# Patient Record
Sex: FEMALE | Race: WHITE | NOT HISPANIC OR LATINO | Employment: FULL TIME | URBAN - METROPOLITAN AREA
[De-identification: names, ages, dates, MRNs, and addresses within clinical notes are randomized per-mention and may not be internally consistent; named-entity substitution may affect disease eponyms.]

---

## 2021-01-20 LAB
BASOPHILS # BLD AUTO: 0.02 X10(3)/MCL (ref 0.01–0.08)
BASOPHILS NFR BLD AUTO: 0.2 % (ref 0.1–1.2)
EOSINOPHIL # BLD AUTO: 0.05 X10(3)/MCL (ref 0.04–0.36)
EOSINOPHIL NFR BLD AUTO: 0.5 % (ref 0.7–7)
ERYTHROCYTE [DISTWIDTH] IN BLOOD BY AUTOMATED COUNT: 12.2 % (ref 11–14.5)
HCT VFR BLD AUTO: 39.4 % (ref 36–48)
HCV AB SERPL QL IA: NORMAL
HGB BLD-MCNC: 13 G/DL (ref 11.8–16)
IMM GRANULOCYTES # BLD AUTO: 0.02 X10E3/UL (ref 0–0.03)
IMM GRANULOCYTES NFR BLD AUTO: 0.2 % (ref 0–0.5)
LYMPHOCYTES # BLD AUTO: 2.43 X10(3)/MCL (ref 1.16–3.74)
LYMPHOCYTES NFR BLD AUTO: 25.3 % (ref 20–55)
MCH RBC QN AUTO: 30.1 PG (ref 27–34)
MCHC RBC AUTO-ENTMCNC: 33 G/DL (ref 31–37)
MCV RBC AUTO: 91.2 FL (ref 79–99)
MONOCYTES # BLD AUTO: 0.89 X10(3)/MCL (ref 0.24–0.36)
MONOCYTES NFR BLD AUTO: 9.3 % (ref 4.7–12.5)
NEUTROPHILS # BLD AUTO: 6.19 X10(3)/MCL (ref 1.56–6.13)
NEUTROPHILS NFR BLD AUTO: 64.5 % (ref 37–73)
PLATELET # BLD AUTO: 292 X10(3)/MCL (ref 140–371)
PMV BLD AUTO: 10 FL (ref 9.4–12.4)
RBC # BLD AUTO: 4.32 X10(6)/MCL (ref 4–5.1)
WBC # SPEC AUTO: 9.6 X10(3)/MCL (ref 4–11.5)

## 2021-05-12 LAB
BASOPHILS # BLD AUTO: 0.02 X10(3)/MCL (ref 0.01–0.08)
BASOPHILS NFR BLD AUTO: 0.2 % (ref 0.1–1.2)
EOSINOPHIL # BLD AUTO: 0.06 X10(3)/MCL (ref 0.04–0.36)
EOSINOPHIL NFR BLD AUTO: 0.5 % (ref 0.7–7)
ERYTHROCYTE [DISTWIDTH] IN BLOOD BY AUTOMATED COUNT: 13.1 % (ref 11–14.5)
GLUCOSE 1H P 100 G GLC PO SERPL-MCNC: 93 MG/DL (ref 70–140)
HCT VFR BLD AUTO: 40 % (ref 36–48)
HGB BLD-MCNC: 12.5 G/DL (ref 11.8–16)
IMM GRANULOCYTES # BLD AUTO: 0.06 X10E3/UL (ref 0–0.03)
IMM GRANULOCYTES NFR BLD AUTO: 0.5 % (ref 0–0.5)
LYMPHOCYTES # BLD AUTO: 2.22 X10(3)/MCL (ref 1.16–3.74)
LYMPHOCYTES NFR BLD AUTO: 18.2 % (ref 20–55)
MCH RBC QN AUTO: 29.8 PG (ref 27–34)
MCHC RBC AUTO-ENTMCNC: 31.3 G/DL (ref 31–37)
MCV RBC AUTO: 95.5 FL (ref 79–99)
MONOCYTES # BLD AUTO: 0.78 X10(3)/MCL (ref 0.24–0.36)
MONOCYTES NFR BLD AUTO: 6.4 % (ref 4.7–12.5)
NEUTROPHILS # BLD AUTO: 9.08 X10(3)/MCL (ref 1.56–6.13)
NEUTROPHILS NFR BLD AUTO: 74.2 % (ref 37–73)
PLATELET # BLD AUTO: 301 X10(3)/MCL (ref 140–371)
PMV BLD AUTO: 10.1 FL (ref 9.4–12.4)
RBC # BLD AUTO: 4.19 X10(6)/MCL (ref 4–5.1)
RPR SER QL: NORMAL
WBC # SPEC AUTO: 12.2 X10(3)/MCL (ref 4–11.5)

## 2021-07-20 LAB
GLUCOSE UR QL STRIP: NEGATIVE
KETONES UR QL STRIP: NEGATIVE
PROTEIN, POC: NEGATIVE

## 2021-07-22 LAB
HBV SURFACE AG SERPL QL IA: NEGATIVE
HIV 1+2 AB+HIV1 P24 AG SERPL QL IA: NORMAL
RPR SER QL: NORMAL

## 2022-04-30 ENCOUNTER — HISTORICAL (OUTPATIENT)
Dept: ADMINISTRATIVE | Facility: HOSPITAL | Age: 32
End: 2022-04-30

## 2022-05-03 NOTE — HISTORICAL [facility] CERNER
This is a historical note converted from Brenda. Formatting and pictures may have been removed.  Please reference Brenda for original formatting and attached multimedia. Chief Complaint  PRESENTS TO CLINIC 11 WEEKS AND 5 DAYS FOR OB VISIT.  History of Present Illness  PRESENTS OT CLINIC 11 WEEKS AND 5 DAYS FOR OB VISIT. PNLS AND NIPS TODAY.  LMP/EGA/KAPIL  Gestational Age (EGA) and KAPIL?? ? * Note: EGA calculated as of 01/20/2021  ?  KAPIL:?08/06/2021???EGA*:?11 weeks 5 days ? ? ? ? ? ?Type:?Authoritative??????Method Date:?10/30/2020  ?  ?? ? ?Method:?Last Menstrual Period?(10/30/2020)  ?? ? ?Confirmation:?Confirmed  ?? ? ?Description:?--  ?? ? ?Comments:?--  ?? ? ?Entered by:?Giana Felipe on 12/30/2020?  ?  Other KAPIL Calculations for this Pregnancy:  ?? ? ?No additional KAPIL calculations have been recorded for this pregnancy  Gynecological History  Last Menstrual Period: 07/01/20  Date of Last Pap Smear: 07/07/20  Menstrual Status Intake: Menarcheal  Age of Menarche: 14  STIs/STDs: No  Abnormal Pap: No  Current Birth Control Method: None  Dysmenorrhea: None  Flow: Light  Duration of Flow: 4 DAYS  Frequency of Cycle: MONTHLY  Sexually Active: Yes  Review of Systems  Pertinent findings are noted in the above HPI. All other systems were reviewed and are negative.?  Physical Exam  Vitals & Measurements  HR:?92(Peripheral)? BP:?105/57?  HT:?157.48?cm? WT:?73.300?kg?  General Appearance: healthy, well-nourished and well-developed in no acute distress.  Skin: normal  Neuro/Psych: Orientation to time, place and person. Normal mood and affect and active, alert  Neck/Thyroid: Inspection/Palpation: normal. Thyroid: normal size and shape.  Respiratory: Auscultation: clear to auscultation bilaterally. Respiratory Effort: normal.  Cardiovascular: Auscultation: regular rate and rhythm  Breast:  Right: Inspection/palpation: no discharge, no masses present, no nipple retraction, no skin changes, no skin dimpling, no tenderness, no  lymphadenopathy, no axillary mass, no axillary tenderness.  Left: Inspection/palpation: no discharge, no masses present, no nipple retraction, no skin changes, no skin dimpling, no tenderness, no lymphadenopathy, no axillary mass, no axillary tenderness.  Abdomen: Auscultation/Inspection/Palpation: No tenderness or masses. Soft, nondistended  Genitourinary:  External Genitalia: normal, no lesions.  Vagina: no lesions, no cystocele, no rectocele.  Bladder: no mass, nontender.  Urethra: no erythema or lesions present.  Cervix: no discharge, no lesions, non tender.  Uterus: nontender, normal contour, normal mobility, normal size.  Adnexa: no masses, no tenderness.  Anus and Perineum: visually normal.?  Assessment/Plan  1.?Encounter for maternal care for low transverse scar from previous  delivery?O34.211  Ordered:  Clinic Follow up, *Est. 21 3:00:00 CST, Order for future visit, Encounter for maternal care for low transverse scar from previous  delivery  11 weeks gestation of pregnancy, JAY ECHOLS  OB Visit Global -No Charge, Encounter for maternal care for low transverse scar from previous  delivery  11 weeks gestation of pregnancy, JAY ECHOLS, 21 10:58:00 CST  ?  2.?11 weeks gestation of pregnancy?Z3A.11  Ordered:  Clinic Follow up, *Est. 21 3:00:00 CST, Order for future visit, Encounter for maternal care for low transverse scar from previous  delivery  11 weeks gestation of pregnancy, JAY ECHOLS  OB Visit Global -No Charge, Encounter for maternal care for low transverse scar from previous  delivery  11 weeks gestation of pregnancy, JAY RODRIGUEZN, 21 10:58:00 CST  ?  Referrals  Clinic Follow up, *Est. 21 3:00:00 CST, Order for future visit, Encounter for maternal care for low transverse scar from previous  delivery  11 weeks gestation of pregnancy, JAY ECHOLS   OB History  Pregnancy History???G4  P0(1,2,2,1)?? ??  Pregnancy # 1  Baby 1?????????????Outcome Date:?2017 ??? Outcome:?Live Birth  ???Outcome or Result:?Spontaneous   ???Gender:?--????????Gest Age:?7 weeks ??????Wt:?--  ???Hospital:?--????????Dm Labor:?--  ???Gabbi Name:?--?????Babys Father:?--  ?  Pregnancy # 2  Baby 1?????????????Outcome Date:?2018 ??? Outcome:?Live Birth  ???Outcome or Result:?Spontaneous  with D&C  ???Gender:?--????????Gest Age:?8 weeks ??????Wt:?--  ???Hospital:?--????????Dm Labor:?--  ???Gabbi Name:?--?????Babys Father:?--  ?  Pregnancy # 3  Baby 1?????????????Outcome Date:?2019????? Outcome:?Live Birth  ???Outcome or Result:?  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 ?Not taking  folic acid 1 mg oral tablet, 1 mg= 1 tab(s), Oral, Daily, 5 refills   Prental DHA One Rx, Oral, Daily  Allergies  No Known Allergies  Social History  Abuse/Neglect  No, 2020  No, 2020  Alcohol  Current, 2020  Sexual  Sexually active: Yes. No, 2020  Substance Use  Never, 2020  Never, 2020  Tobacco  Never (less than 100 in lifetime), N/A, 2020  Never (less than 100 in lifetime), N/A, 2020  Family History  Family history is negative  Health Maintenance  Health Maintenance  ???Pending?(in the next year)  ??? ??OverDue  ??? ? ? ?Influenza Vaccine due??10/01/20??and every 1??day(s)  ??? ??Due?  ??? ? ? ?Alcohol Misuse Screening due??21??and every 1??year(s)  ??? ? ? ?ADL Screening due??21??and every 1??year(s)  ??? ? ?  ?Depression Screening due??01/20/21??Unknown Frequency  ??? ? ? ?Tetanus Vaccine due??01/20/21??and every 10??year(s)  ??? ??Due In Future?  ??? ? ? ?Blood Pressure Screening not due until??12/30/21??and every 1??year(s)  ??? ? ? ?Obesity Screening not due until??01/01/22??and every 1??year(s)  ???Satisfied?(in the past 1 year)  ??? ??Satisfied?  ??? ? ? ?Blood Pressure Screening on??01/20/21.??Satisfied by Giana Felipe  ??? ? ? ?Body Mass Index Check on??12/30/20.??Satisfied by Giana Felipe  ??? ? ? ?Cervical Cancer Screening on??07/07/20.??Satisfied by Krissy Newton  ??? ? ? ?Obesity Screening on??01/20/21.??Satisfied by Giana Felipe  ?

## 2022-08-31 LAB
BENZODIAZEPINES: NORMAL
C TRACH DNA SPEC QL NAA+PROBE: NEGATIVE
CLARITHRO TITR SBT: NORMAL {TITER}
COCAINE (METAB.): NORMAL
MARIJUANA (THC) METABOLITE: NORMAL
METHADONE: NORMAL
N GONORRHOEA DNA SPEC QL NAA+PROBE: NEGATIVE
OPIATES UR QL SCN: NORMAL
PAP RECOMMENDATION EXT: NORMAL
PAP SMEAR: NORMAL
URINE CULTURE, ROUTINE: NORMAL

## 2022-09-14 LAB
ABO + RH BLD: NORMAL
GLUCOSE SERPL-MCNC: 120 MG/DL
HBV SURFACE AG SERPL QL IA: NONREACTIVE
HCT VFR BLD AUTO: 37.6 % (ref 36–46)
HCV AB SERPL QL IA: NEGATIVE
HGB BLD-MCNC: 13.1 G/DL (ref 12–16)
HIV 1+2 AB+HIV1 P24 AG SERPL QL IA: NEGATIVE
INDIRECT COOMBS: NEGATIVE
MCV RBC AUTO: 88.9 FL (ref 82–108)
PLATELET # BLD AUTO: 258 K/UL (ref 150–399)
RUBELLA IMMUNE STATUS: NORMAL
T PALLIDUM AB SER QL: NONREACTIVE

## 2022-09-17 ENCOUNTER — HISTORICAL (OUTPATIENT)
Dept: ADMINISTRATIVE | Facility: HOSPITAL | Age: 32
End: 2022-09-17

## 2022-09-29 LAB — EXT MATERNIT21: NORMAL

## 2022-10-20 LAB — QUAD SCREEN: NORMAL

## 2023-01-11 LAB
GLUCOSE SERPL-MCNC: 98 MG/DL
HCT VFR BLD AUTO: 37.5 % (ref 36–46)
HGB BLD-MCNC: 12.5 G/DL (ref 12–16)
MCV RBC AUTO: 91.9 FL (ref 82–108)
PLATELET # BLD AUTO: 246 K/UL (ref 150–399)

## 2023-01-23 ENCOUNTER — DOCUMENTATION ONLY (OUTPATIENT)
Dept: ADMINISTRATIVE | Facility: HOSPITAL | Age: 33
End: 2023-01-23
Payer: COMMERCIAL

## 2023-01-26 ENCOUNTER — TELEPHONE (OUTPATIENT)
Dept: OBSTETRICS AND GYNECOLOGY | Facility: CLINIC | Age: 33
End: 2023-01-26
Payer: COMMERCIAL

## 2023-01-26 NOTE — TELEPHONE ENCOUNTER
----- Message from Lacy Lejeune, MA sent at 1/26/2023  3:05 PM CST -----  PT IS CALLING FOR A CALL BACK FROM A NURSE ABOUT SOME QUESTIONS SHE HAD ABOUT SOME BLOODWORK

## 2023-01-26 NOTE — TELEPHONE ENCOUNTER
RETURNED. PT. CALL.  CONFIRMED. PT. WENT TO [facility] WITH CHILD AND HAS SOME CONCERNS REGARDING THIS PREG. AFTER SPEAKING WITH NEUROLOGIST.APPOINT. SCHEDULED TO DISCUSS CONCERNS WITH DR. MAC.

## 2023-02-02 ENCOUNTER — ROUTINE PRENATAL (OUTPATIENT)
Dept: OBSTETRICS AND GYNECOLOGY | Facility: CLINIC | Age: 33
End: 2023-02-02
Payer: COMMERCIAL

## 2023-02-02 VITALS — WEIGHT: 170.31 LBS | HEART RATE: 86 BPM | DIASTOLIC BLOOD PRESSURE: 56 MMHG | SYSTOLIC BLOOD PRESSURE: 94 MMHG

## 2023-02-02 DIAGNOSIS — Z3A.30 30 WEEKS GESTATION OF PREGNANCY: ICD-10-CM

## 2023-02-02 DIAGNOSIS — O34.211 ENCOUNTER FOR MATERNAL CARE FOR LOW TRANSVERSE SCAR FROM PREVIOUS CESAREAN DELIVERY: Primary | ICD-10-CM

## 2023-02-02 PROBLEM — Z98.891 HISTORY OF CESAREAN SECTION, LOW TRANSVERSE: Status: ACTIVE | Noted: 2023-02-02

## 2023-02-02 LAB
BASOPHILS # BLD AUTO: 0.03 X10(3)/MCL (ref 0.01–0.08)
BASOPHILS NFR BLD AUTO: 0.3 % (ref 0.1–1.2)
BILIRUB UR QL STRIP: NEGATIVE
C-REACTIVE PROTEIN(NORTH LA, ST. MARY, RP & JENNINGS): 0.7 MG/DL (ref 0–0.9)
EOSINOPHIL # BLD AUTO: 0.02 X10(3)/MCL (ref 0.04–0.36)
EOSINOPHIL NFR BLD AUTO: 0.2 % (ref 0.7–7)
ERYTHROCYTE [DISTWIDTH] IN BLOOD BY AUTOMATED COUNT: 12.4 % (ref 11–14.5)
FERRITIN SERPL-MCNC: 28.6 NG/ML (ref 6.24–264)
GLUCOSE UR QL STRIP: NEGATIVE
HCT VFR BLD AUTO: 36.3 % (ref 36–48)
HGB BLD-MCNC: 12.5 GM/DL (ref 11.8–16)
IMM GRANULOCYTES # BLD AUTO: 0.06 X10(3)/MCL (ref 0–0.03)
IMM GRANULOCYTES NFR BLD AUTO: 0.5 % (ref 0–0.5)
KETONES UR QL STRIP: NEGATIVE
LEUKOCYTE ESTERASE UR QL STRIP: NEGATIVE
LYMPHOCYTES # BLD AUTO: 2.12 X10(3)/MCL (ref 1.16–3.74)
LYMPHOCYTES NFR BLD AUTO: 19.4 % (ref 20–55)
MCH RBC QN AUTO: 30.9 PG (ref 27–34)
MCV RBC AUTO: 89.9 FL (ref 79–99)
MEAN CELL HEMOGLOBIN CONCENTRATION (OHS) G/DL: 34.4 G/DL (ref 31–37)
MONOCYTES # BLD AUTO: 0.73 X10(3)/MCL (ref 0.24–0.36)
MONOCYTES NFR BLD AUTO: 6.7 % (ref 4.7–12.5)
NEUTROPHILS # BLD AUTO: 7.98 X10(3)/MCL (ref 1.56–6.13)
NEUTROPHILS NFR BLD AUTO: 72.9 % (ref 37–73)
NRBC BLD AUTO-RTO: 0 % (ref 0–1)
PH, POC UA: 7
PLATELET # BLD AUTO: 269 X10(3)/MCL (ref 140–371)
PMV BLD AUTO: 9.6 FL (ref 9.4–12.4)
POC BLOOD, URINE: NEGATIVE
POC NITRATES, URINE: NEGATIVE
PROT UR QL STRIP: POSITIVE
RBC # BLD AUTO: 4.04 X10(6)/MCL (ref 4–5.1)
SP GR UR STRIP: 1.03 (ref 1–1.03)
UROBILINOGEN UR STRIP-ACNC: 1 (ref 0.1–1.1)
WBC # SPEC AUTO: 10.9 X10(3)/MCL (ref 4–11.5)

## 2023-02-02 PROCEDURE — 86140 C-REACTIVE PROTEIN: CPT | Performed by: OBSTETRICS & GYNECOLOGY

## 2023-02-02 PROCEDURE — 83550 IRON BINDING TEST: CPT | Performed by: OBSTETRICS & GYNECOLOGY

## 2023-02-02 PROCEDURE — 0502F PR SUBSEQUENT PRENATAL CARE: ICD-10-PCS | Mod: ,,, | Performed by: OBSTETRICS & GYNECOLOGY

## 2023-02-02 PROCEDURE — 82728 ASSAY OF FERRITIN: CPT | Performed by: OBSTETRICS & GYNECOLOGY

## 2023-02-02 PROCEDURE — 0502F SUBSEQUENT PRENATAL CARE: CPT | Mod: ,,, | Performed by: OBSTETRICS & GYNECOLOGY

## 2023-02-02 PROCEDURE — 85025 COMPLETE CBC W/AUTO DIFF WBC: CPT | Performed by: OBSTETRICS & GYNECOLOGY

## 2023-02-02 RX ORDER — FOLIC ACID 0.8 MG
0.8 TABLET ORAL
Status: ON HOLD | COMMUNITY
Start: 2022-08-31 | End: 2023-04-01 | Stop reason: HOSPADM

## 2023-02-02 NOTE — PROGRESS NOTES
HPI  Gena Benavidez is a 33 y.o.   31w0d here for Routine Prenatal Visit (30 WKS 1 DAY. Wants to be tested for anemia.)  Patient's second infant with hypotonia, was evaluated at [facility] by neurologist who recommended she be tested for anemia and have iron studies done. HROB visit went well and fetus was moving all extremities well. Placenta was free of c/s scar.   Today, she denies VB, ROM, CTXS. Reports active FM.     Gena's allergies, medications, history, and problem list were updated as appropriate.    ROS:  A comprehensive review of symptoms was completed and negative except as noted above.    Physical Exam:  BP (!) 94/56   Pulse 86   Wt 77.3 kg (170 lb 4.9 oz)   LMP 2022     Gen: No distress  Abdomen: Gravid, non-tender  Extremities: No edema  FHTS-130    ASSESSMENT/PLAN:  1. Encounter for maternal care for low transverse scar from previous  delivery    2. 30 weeks gestation of pregnancy  -     POCT Urinalysis, Dipstick, Automated, W/O Scope  -     CBC Auto Differential; Future; Expected date: 2023  -     Iron and TIBC; Future; Expected date: 2023  -     C-Reactive Protein; Future; Expected date: 2023  -     Ferritin; Future; Expected date: 2023    Repeat C/S scheduled for  at [facility].      Recent Results (from the past 24 hour(s))   POCT Urinalysis, Dipstick, Automated, W/O Scope    Collection Time: 23  9:47 AM   Result Value Ref Range    POC Blood, Urine Negative Negative    POC Bilirubin, Urine Negative Negative    POC Urobilinogen, Urine 1.0 0.1 - 1.1    POC Ketones, Urine Negative Negative    POC Protein, Urine Positive (A) Negative    POC Nitrates, Urine Negative Negative    POC Glucose, Urine Negative Negative    pH, UA 7.0     POC Specific Gravity, Urine 1.030 (A) 1.003 - 1.029    POC Leukocytes, Urine Negative Negative       Labor unit, Kick Counts, and Pre-eclampsia given    Follow Up:  Follow up in about 2 weeks (around  2/16/2023) for OB F/U.

## 2023-02-03 LAB
IRON SATN MFR SERPL: 22 % (ref 20–50)
IRON SERPL-MCNC: 92 UG/DL (ref 50–170)
TIBC SERPL-MCNC: 327 UG/DL (ref 70–310)
TIBC SERPL-MCNC: 419 UG/DL (ref 250–450)
TRANSFERRIN SERPL-MCNC: 386 MG/DL (ref 180–382)

## 2023-02-09 ENCOUNTER — PATIENT MESSAGE (OUTPATIENT)
Dept: OTHER | Facility: OTHER | Age: 33
End: 2023-02-09
Payer: COMMERCIAL

## 2023-02-16 ENCOUNTER — ROUTINE PRENATAL (OUTPATIENT)
Dept: OBSTETRICS AND GYNECOLOGY | Facility: CLINIC | Age: 33
End: 2023-02-16
Payer: COMMERCIAL

## 2023-02-16 VITALS — WEIGHT: 172.94 LBS | SYSTOLIC BLOOD PRESSURE: 100 MMHG | HEART RATE: 97 BPM | DIASTOLIC BLOOD PRESSURE: 70 MMHG

## 2023-02-16 DIAGNOSIS — O34.211 MATERNAL CARE DUE TO LOW TRANSVERSE UTERINE SCAR FROM PREVIOUS CESAREAN DELIVERY: Primary | ICD-10-CM

## 2023-02-16 DIAGNOSIS — Z3A.33 33 WEEKS GESTATION OF PREGNANCY: ICD-10-CM

## 2023-02-16 LAB
BILIRUB UR QL STRIP: NEGATIVE
GLUCOSE UR QL STRIP: NEGATIVE
KETONES UR QL STRIP: NEGATIVE
LEUKOCYTE ESTERASE UR QL STRIP: POSITIVE
PH, POC UA: 7
POC BLOOD, URINE: POSITIVE
POC NITRATES, URINE: NEGATIVE
PROT UR QL STRIP: NEGATIVE
SP GR UR STRIP: 1.01 (ref 1–1.03)
UROBILINOGEN UR STRIP-ACNC: 0.2 (ref 0.1–1.1)

## 2023-02-16 PROCEDURE — 0502F SUBSEQUENT PRENATAL CARE: CPT | Mod: ,,, | Performed by: OBSTETRICS & GYNECOLOGY

## 2023-02-16 PROCEDURE — 87088 URINE BACTERIA CULTURE: CPT | Performed by: OBSTETRICS & GYNECOLOGY

## 2023-02-16 PROCEDURE — 0502F PR SUBSEQUENT PRENATAL CARE: ICD-10-PCS | Mod: ,,, | Performed by: OBSTETRICS & GYNECOLOGY

## 2023-02-16 NOTE — PROGRESS NOTES
HPI  Gena Benavidez is a 33 y.o.   33w0d here for Routine Prenatal Visit   PRESENTS TO CLINIC 33 WEEKS 0 DAYS WITH NO COMPLAINTS  CBC AND IRON STUDIES WERE NL.   DENIES VB, ROM, CTXS  REPORTS ACTIVE FM.     Gena's allergies, medications, history, and problem list were updated as appropriate.    ROS:  A comprehensive review of symptoms was completed and negative except as noted above.    Physical Exam:  /70   Pulse 97   Wt 78.4 kg (172 lb 15.2 oz)   LMP 2022     Gen: No distress  Abdomen: Gravid, non-tender  Extremities: No edema    Fundal Height (cm): 33 cm  Fetal Heart Rate: 133  Movement: Present    Chaperone Present  ASSESSMENT/PLAN:  1. Maternal care due to low transverse uterine scar from previous  delivery    2. 33 weeks gestation of pregnancy  -     POCT Urinalysis, Dipstick, Automated, W/O Scope  -     Cancel: POCT Urinalysis, Dipstick, Automated, W/O Scope  -     Urine culture         Recent Results (from the past 24 hour(s))   POCT Urinalysis, Dipstick, Automated, W/O Scope    Collection Time: 23  9:09 AM   Result Value Ref Range    POC Blood, Urine Positive (A) Negative    POC Bilirubin, Urine Negative Negative    POC Urobilinogen, Urine 0.2 0.1 - 1.1    POC Ketones, Urine Negative Negative    POC Protein, Urine Negative Negative    POC Nitrates, Urine Negative Negative    POC Glucose, Urine Negative Negative    pH, UA 7.0     POC Specific Gravity, Urine 1.015 1.003 - 1.029    POC Leukocytes, Urine Positive (A) Negative       Labor unit, Kick Counts, and Pre-eclampsia given  Chaperone Present  Follow Up:  Follow up in about 2 weeks (around 3/2/2023) for OB F/U.

## 2023-02-19 LAB — BACTERIA UR CULT: NORMAL

## 2023-03-01 ENCOUNTER — ROUTINE PRENATAL (OUTPATIENT)
Dept: OBSTETRICS AND GYNECOLOGY | Facility: CLINIC | Age: 33
End: 2023-03-01
Payer: COMMERCIAL

## 2023-03-01 VITALS — SYSTOLIC BLOOD PRESSURE: 94 MMHG | HEART RATE: 80 BPM | DIASTOLIC BLOOD PRESSURE: 56 MMHG | WEIGHT: 176 LBS

## 2023-03-01 DIAGNOSIS — Z3A.34 34 WEEKS GESTATION OF PREGNANCY: Primary | ICD-10-CM

## 2023-03-01 LAB
BILIRUB UR QL STRIP: NEGATIVE
GLUCOSE UR QL STRIP: NEGATIVE
KETONES UR QL STRIP: NEGATIVE
LEUKOCYTE ESTERASE UR QL STRIP: NEGATIVE
PH, POC UA: 7
POC BLOOD, URINE: NEGATIVE
POC NITRATES, URINE: NEGATIVE
PROT UR QL STRIP: NEGATIVE
SP GR UR STRIP: 1.02 (ref 1–1.03)
UROBILINOGEN UR STRIP-ACNC: 2 (ref 0.1–1.1)

## 2023-03-01 PROCEDURE — 0502F SUBSEQUENT PRENATAL CARE: CPT | Mod: ,,, | Performed by: NURSE PRACTITIONER

## 2023-03-01 PROCEDURE — 0502F PR SUBSEQUENT PRENATAL CARE: ICD-10-PCS | Mod: ,,, | Performed by: NURSE PRACTITIONER

## 2023-03-01 RX ORDER — CALCIUM CARBONATE 200(500)MG
1 TABLET,CHEWABLE ORAL DAILY
Status: ON HOLD | COMMUNITY
End: 2023-04-01 | Stop reason: HOSPADM

## 2023-03-01 NOTE — PROGRESS NOTES
HPI  Gena Benavidez is a 33 y.o.   34w6d here for Routine Prenatal Visit (34 weeks and 6 days. C/O reflux taking OTC Tums with relief/)      Annes allergies, medications, history, and problem list were updated as appropriate.    ROS:  A comprehensive review of symptoms was completed and negative except as noted above.    Physical Exam:  BP (!) 94/56   Pulse 80   Wt 79.8 kg (176 lb)   LMP 2022     Gen: No distress  Abdomen: Gravid, non-tender  Extremities: No edema    Fundal Height (cm): 34 cm  Fetal Heart Rate: 146  Movement: Present    Chaperone Present  ASSESSMENT/PLAN:  1. 34 weeks gestation of pregnancy  -     POCT Urinalysis, Dipstick, Automated, W/O Scope         Recent Results (from the past 24 hour(s))   POCT Urinalysis, Dipstick, Automated, W/O Scope    Collection Time: 23  2:27 PM   Result Value Ref Range    POC Blood, Urine Negative Negative    POC Bilirubin, Urine Negative Negative    POC Urobilinogen, Urine 2.0 (A) 0.1 - 1.1    POC Ketones, Urine Negative Negative    POC Protein, Urine Negative Negative    POC Nitrates, Urine Negative Negative    POC Glucose, Urine Negative Negative    pH, UA 7.0     POC Specific Gravity, Urine 1.020 1.003 - 1.029    POC Leukocytes, Urine Negative Negative       Labor unit, Kick Counts, and Pre-eclampsia given  Chaperone Present  Follow Up:  Follow up in about 2 weeks (around 3/15/2023) for OB Visit.

## 2023-03-02 ENCOUNTER — PATIENT MESSAGE (OUTPATIENT)
Dept: OTHER | Facility: OTHER | Age: 33
End: 2023-03-02
Payer: COMMERCIAL

## 2023-03-02 ENCOUNTER — TELEPHONE (OUTPATIENT)
Dept: OBSTETRICS AND GYNECOLOGY | Facility: CLINIC | Age: 33
End: 2023-03-02
Payer: COMMERCIAL

## 2023-03-02 NOTE — TELEPHONE ENCOUNTER
"----- Message from Jojo Linton sent at 3/2/2023  3:45 PM CST -----  Regarding: CALL BACK  PT CALLED ASKING IF A NURSE CAN CALL HER BACK. SHE SAID THAT SHE'S BEEN HAVING SOME HEARTBURN AND NAUSEA AND SOME "WEIRD STOOL" AND SHE SAID SHE SEEN THAT SOMETHING ON HER UA FROM YESTERDAY WAS ELEVATED. [phone]    "

## 2023-03-02 NOTE — TELEPHONE ENCOUNTER
RETURNED PT. CALL.  CONFIRMED.STATES HEARTBURN HAS BEEN WORSE AND HAS BEEN HAVING PALE YELLOW STOOLS. HAS BEEN TAKING TUMS FOR HEARTBURN. DR. MAC NOTIFIED OF C/O'S. INSTRUCTED PT. CAN TAKE OTC PRILOSEC OR PREVACID AS DIRECTED AND MONITOR SYMPTOMS IF WORSEN OR HAS EPIGASTRIC PAIN GO TO LABOR UNIT FOR EVALUATION. APPOINT SCHEDULED FOR NEXT WEEK.

## 2023-03-08 ENCOUNTER — ROUTINE PRENATAL (OUTPATIENT)
Dept: OBSTETRICS AND GYNECOLOGY | Facility: CLINIC | Age: 33
End: 2023-03-08
Payer: COMMERCIAL

## 2023-03-08 VITALS — WEIGHT: 171.75 LBS | HEART RATE: 80 BPM | DIASTOLIC BLOOD PRESSURE: 60 MMHG | SYSTOLIC BLOOD PRESSURE: 108 MMHG

## 2023-03-08 DIAGNOSIS — Z3A.36 36 WEEKS GESTATION OF PREGNANCY: Primary | ICD-10-CM

## 2023-03-08 LAB
BILIRUB UR QL STRIP: NEGATIVE
GLUCOSE UR QL STRIP: NEGATIVE
KETONES UR QL STRIP: NEGATIVE
LEUKOCYTE ESTERASE UR QL STRIP: NEGATIVE
PH, POC UA: 5.5
POC BLOOD, URINE: NEGATIVE
POC NITRATES, URINE: NEGATIVE
PROT UR QL STRIP: NEGATIVE
SP GR UR STRIP: 1.01 (ref 1–1.03)
UROBILINOGEN UR STRIP-ACNC: 0.2 (ref 0.1–1.1)

## 2023-03-08 PROCEDURE — 0502F PR SUBSEQUENT PRENATAL CARE: ICD-10-PCS | Mod: ,,, | Performed by: NURSE PRACTITIONER

## 2023-03-08 PROCEDURE — 0502F SUBSEQUENT PRENATAL CARE: CPT | Mod: ,,, | Performed by: NURSE PRACTITIONER

## 2023-03-08 NOTE — PROGRESS NOTES
HPI  Gena Benavidez is a 33 y.o.   35w6d here for Routine Prenatal Visit (PT PRESENTS TO CLINIC FOR OB VISIT, OTHERWISE NO COMPLAINTS)      Gena's allergies, medications, history, and problem list were updated as appropriate.  T  ROS:  A comprehensive review of symptoms was completed and negative except as noted above.    Physical Exam:  /60   Pulse 80   Wt 77.9 kg (171 lb 11.8 oz)   LMP 2022     Gen: No distress  Abdomen: Gravid, non-tender  Extremities: No edema    [unfilled]    Chaperone Present  ASSESSMENT/PLAN:  1. 36 weeks gestation of pregnancy  -     POCT Urinalysis, Dipstick, Automated, W/O Scope         Recent Results (from the past 24 hour(s))   POCT Urinalysis, Dipstick, Automated, W/O Scope    Collection Time: 23 11:10 AM   Result Value Ref Range    POC Blood, Urine Negative Negative    POC Bilirubin, Urine Negative Negative    POC Urobilinogen, Urine 0.2 0.1 - 1.1    POC Ketones, Urine Negative Negative    POC Protein, Urine Negative Negative    POC Nitrates, Urine Negative Negative    POC Glucose, Urine Negative Negative    pH, UA 5.5     POC Specific Gravity, Urine 1.010 1.003 - 1.029    POC Leukocytes, Urine Negative Negative       Labor unit, Kick Counts, and Pre-eclampsia given  Chaperone Present  Follow Up:  Follow up in about 1 week (around 3/15/2023) for OB FOLLOW UP.

## 2023-03-15 ENCOUNTER — ROUTINE PRENATAL (OUTPATIENT)
Dept: OBSTETRICS AND GYNECOLOGY | Facility: CLINIC | Age: 33
End: 2023-03-15
Payer: COMMERCIAL

## 2023-03-15 VITALS — HEART RATE: 74 BPM | WEIGHT: 171 LBS | SYSTOLIC BLOOD PRESSURE: 104 MMHG | DIASTOLIC BLOOD PRESSURE: 66 MMHG

## 2023-03-15 DIAGNOSIS — Z3A.36 36 WEEKS GESTATION OF PREGNANCY: ICD-10-CM

## 2023-03-15 DIAGNOSIS — O34.211 MATERNAL CARE DUE TO LOW TRANSVERSE UTERINE SCAR FROM PREVIOUS CESAREAN DELIVERY: Primary | ICD-10-CM

## 2023-03-15 LAB
BILIRUB UR QL STRIP: NEGATIVE
GLUCOSE UR QL STRIP: NEGATIVE
KETONES UR QL STRIP: POSITIVE
LEUKOCYTE ESTERASE UR QL STRIP: POSITIVE
PH, POC UA: 7
POC BLOOD, URINE: NEGATIVE
POC NITRATES, URINE: NEGATIVE
PROT UR QL STRIP: NEGATIVE
SP GR UR STRIP: 1.02 (ref 1–1.03)
UROBILINOGEN UR STRIP-ACNC: 1 (ref 0.1–1.1)

## 2023-03-15 PROCEDURE — 0502F SUBSEQUENT PRENATAL CARE: CPT | Mod: ,,, | Performed by: OBSTETRICS & GYNECOLOGY

## 2023-03-15 PROCEDURE — 0502F PR SUBSEQUENT PRENATAL CARE: ICD-10-PCS | Mod: ,,, | Performed by: OBSTETRICS & GYNECOLOGY

## 2023-03-15 PROCEDURE — 87653 STREP B DNA AMP PROBE: CPT | Performed by: OBSTETRICS & GYNECOLOGY

## 2023-03-15 NOTE — PROGRESS NOTES
HPI  Gena Benavidez is a 33 y.o.   37w0d here for Routine Prenatal Visit   PRE-OP FOR DELIVERY, PROCEDURE DISCUSSED IN DETAIL AS WELL AS RISK, BENEFITS, AND ALTERNATIVES. QUESTIONS ANSWERED AND CONSENTS SIGNED. GBS TODAY.  DENIES VB, ROM, CTXS, REPORTS ACTIVE FM.      Gena's allergies, medications, history, and problem list were updated as appropriate.    ROS:  A comprehensive review of symptoms was completed and negative except as noted above.    Physical Exam:  /66   Pulse 74   Wt 77.6 kg (171 lb)   LMP 2022     Gen: No distress  Abdomen: Gravid, non-tender  Extremities: No edema    Fetal Heart Rate: 138  Movement: Present  Dilation: Closed  Effacement (%): 0   GBS VAGINAL-RECTAL CULTURE COLLECTED.    Recent Results (from the past 24 hour(s))   POCT Urinalysis, Dipstick, Automated, W/O Scope    Collection Time: 03/15/23  2:51 PM   Result Value Ref Range    POC Blood, Urine Negative Negative    POC Bilirubin, Urine Negative Negative    POC Urobilinogen, Urine 1.0 0.1 - 1.1    POC Ketones, Urine Positive (A) Negative    POC Protein, Urine Negative Negative    POC Nitrates, Urine Negative Negative    POC Glucose, Urine Negative Negative    pH, UA 7.0     POC Specific Gravity, Urine 1.025 1.003 - 1.029    POC Leukocytes, Urine Positive (A) Negative     Chaperone Present  ASSESSMENT/PLAN:  1. Maternal care due to low transverse uterine scar from previous  delivery    2. 36 weeks gestation of pregnancy  -     Cancel: Strep Group B by PCR; Future; Expected date: 03/15/2023  -     POCT Urinalysis, Dipstick, Automated, W/O Scope  -     Strep Group B by PCR      Labor unit, Kick Counts, and Pre-eclampsia given  Chaperone Present  Follow Up:  Follow up in about 1 week (around 3/22/2023) for OB F/U.

## 2023-03-16 LAB — STREP B PCR (OHS): NOT DETECTED

## 2023-03-21 ENCOUNTER — HOSPITAL ENCOUNTER (OUTPATIENT)
Dept: PREADMISSION TESTING | Facility: HOSPITAL | Age: 33
Discharge: HOME | End: 2023-03-21
Payer: COMMERCIAL

## 2023-03-21 DIAGNOSIS — Z3A.37 37 WEEKS GESTATION OF PREGNANCY: Primary | ICD-10-CM

## 2023-03-21 LAB
BASOPHILS # BLD AUTO: 0.03 X10(3)/MCL (ref 0.01–0.08)
BASOPHILS NFR BLD AUTO: 0.3 % (ref 0.1–1.2)
EOSINOPHIL # BLD AUTO: 0.02 X10(3)/MCL (ref 0.04–0.36)
EOSINOPHIL NFR BLD AUTO: 0.2 % (ref 0.7–7)
ERYTHROCYTE [DISTWIDTH] IN BLOOD BY AUTOMATED COUNT: 12.9 % (ref 11–14.5)
HBV SURFACE AG SERPL QL IA: NEGATIVE
HBV SURFACE AG SERPL QL IA: NORMAL
HCT VFR BLD AUTO: 37.8 % (ref 36–48)
HGB BLD-MCNC: 12.7 G/DL (ref 11.8–16)
IMM GRANULOCYTES # BLD AUTO: 0.04 X10(3)/MCL (ref 0–0.03)
IMM GRANULOCYTES NFR BLD AUTO: 0.4 % (ref 0–0.5)
LYMPHOCYTES # BLD AUTO: 2.37 X10(3)/MCL (ref 1.16–3.74)
LYMPHOCYTES NFR BLD AUTO: 23.6 % (ref 20–55)
MCH RBC QN AUTO: 30.5 PG (ref 27–34)
MCV RBC AUTO: 90.6 FL (ref 79–99)
MEAN CELL HEMOGLOBIN CONCENTRATION (OHS) G/DL: 33.6 G/DL (ref 31–37)
MONOCYTES # BLD AUTO: 0.66 X10(3)/MCL (ref 0.24–0.36)
MONOCYTES NFR BLD AUTO: 6.6 % (ref 4.7–12.5)
NEUTROPHILS # BLD AUTO: 6.92 X10(3)/MCL (ref 1.56–6.13)
NEUTROPHILS NFR BLD AUTO: 68.9 % (ref 37–73)
NRBC BLD AUTO-RTO: 0 % (ref 0–1)
PLATELET # BLD AUTO: 254 X10(3)/MCL (ref 140–371)
PMV BLD AUTO: 9.8 FL (ref 9.4–12.4)
RBC # BLD AUTO: 4.17 X10(6)/MCL (ref 4–5.1)
WBC # SPEC AUTO: 10 X10(3)/MCL (ref 4–11.5)

## 2023-03-21 PROCEDURE — 85025 COMPLETE CBC W/AUTO DIFF WBC: CPT | Performed by: OBSTETRICS & GYNECOLOGY

## 2023-03-21 PROCEDURE — 87389 HIV-1 AG W/HIV-1&-2 AB AG IA: CPT | Performed by: OBSTETRICS & GYNECOLOGY

## 2023-03-21 PROCEDURE — 86780 TREPONEMA PALLIDUM: CPT | Performed by: OBSTETRICS & GYNECOLOGY

## 2023-03-21 PROCEDURE — 87340 HEPATITIS B SURFACE AG IA: CPT | Performed by: OBSTETRICS & GYNECOLOGY

## 2023-03-21 NOTE — DISCHARGE INSTRUCTIONS
OB Pre-operative Instructions    You are scheduled for an operative procedure on March 30 th at 7 am. Please follow the instructions listed below.     Please report to the OB department on March 30th at 5:00 for your surgery. Enter through the emergency room entrance.     Should you develop a cold, temperature, sore throat, cough, or any other indication of illness prior to your scheduled procedure, please notify your physician.    DO NOT EAT OR DRINK ANYTHING AFTER 12:00 MIDNIGHT the night before your surgery or the morning of your procedure until after your procedure is completed and instructed by your nurse. This includes coffee, water, gum, and mints. You may brush your teeth, but do not swallow any water. Do not use any tobacco products the morning of the procedure (including dips, chews, smoking).    Please bathe and shampoo your hair before coming to the hospital.    Dress casually and wear loose, comfortable clothing. Please remove all make-up and nail polish. You will be given a patient gown for surgery. You must remove all undergarments, jewelry, and dentures unless instructed otherwise. Please leave all valuables at home or in the care of your family.    Any questions or further information should be directed to the out-patient nurse at [phone] between the hours of 8:00 a.m. and 3:00 p.m., Monday through Friday.    PATIENT PRE-OP INSTRUCTIONS:    Dear patient:    To help prevent an infection after your surgery, you will be given 2 towelettes to use the night before your surgery after you shower. Following these instructions carefully may help prevent an infection after your procedure.     Shower: You must shower the evening before your surgery!  A shower is best because it helps to wash away the dirt and germs.  A bath is acceptable if a shower is not available.  Be sure to rinse thoroughly with clean water.     1.  Do not shave the area of your body where surgery will be done.   2.  Wash with  regular soap and water and rinse off. (If bathing change the water.)  3.  Shampoo with your regular shampoo and rinse your hair.   4.  Use a clean towel to dry off your body.   5.  Do not use lotion, cream, or powder afterward.   6.  See instructions for towelettes and follow.   7.  Put on clean clothing and be sure to have clean sheets on your bed.   8.  Wear clean clothes the day of your procedure.     Eleuterio 2% Chlorhexidine Gluconate* Clothes:  On the evening before your surgery after your shower follow these instructions:  Once dry after showering vigorously  wipe the area where your surgery will be performed with a Eleuterio cloth for approximately 3 minutes. Dispose of the first cloth and allow area to air dry for 1 minute. Do not rinse or dry off area.  Use the second cloth to wipe off the rest of your body excluding your face and perineal/buttock areas.  Dispose of cloth and allow area to air dry. Do not rinse.     Condition of Skin:  Please tell your nurse if you are having any issues with your skin.  Any rash, scratch, or break in the skin including insect bites should be reported. Let your nurse know as soon as you arrive.     Prevent infection:  Maintaining good hygiene is very important in infection prevention.  Handwashing is especially helpful.  Cleaning your hands with an alcohol based hand  is also effective.  Please be sure to follow your doctor's instructions in the pre and post operative period!       OB VISITATION POLICY    The OB Department is open to family for patient visitation.  Smoking is not allowed on the premises.  Visitors must check with staff before entering a patient's room if there is a sign posted on the door or in the armstrong.   Visitors may be in the room with baby if they are free from infection or any signs and symptoms of illness.  All visitors must use hand  or soap and water before touching the baby.   No children under age 12 are allowed to sleep overnight or to  be left unattended in room.   Our rooms only accommodate one overnight guest.  While in labor and delivery room, you will be allowed 2 support persons if you choose and one additional guest will be allowed at the discretion of the physician or nurse.   The 2-3 labor and delivery support people may be interchangeable at the discretion of the OB nurse in charge of your care.   Any other visitors will be directed to the waiting room or post-partum room until after delivery.   No one will be allowed to stay in the labor and delivery hallway.  After delivery, visitors will not be limited unless a procedure is in progress or your conditions warrants it.   Every day between 1:00 and 3:00 pm visitors are discouraged from entering the OB unit to encourage a mother/baby rest and bonding time.   Videos and photographs are not allowed to be taken during the delivery process. They may be taken after the baby is born and declared in good condition by nursing staff or physician.   If you will be having a delivery by , you will be allowed (1) support person in operating room unless the procedure is an emergency or under general anesthesia. No video or photography is allowed until after the procedure is complete.  The support person present will be asked to leave the operating room with the baby after delivery or if any problems arise.   Family and friends should be made familiar with the visitor policy for our facility, so that we may insure that the safety and well-being of you and your baby during this very important time.        Things to bring to the Hospital    To help make your stay as comfortable as possible you should bring the following items when you come to the hospital:    TOOTHBRUSH  TOOTHPASTE  SHAMPOO/CONDITIONER  DEODORANT  HAIRBRUSH OR COMB  UNDERWEAR  SOAP  SANITARY NAPKINS-OVERNIGHTS (This item is optional unless you have a preference for the type you use.)  NURSING PADS  BREAST PUMP IF YOU OWN  ONE    Other items needed for both mom and baby include:    Bras (2-3).  These should be ones that were worn during pregnancy or nursing bras for breastfeeding.   Gowns, slippers, and a robe.   One outfit of baby clothes to take the baby home in as well as a blanket. The hospital will provide baby clothes, blankets, diapers, and wipes during the hospital stay.   Federally approved car seat is required for the infant to go home.

## 2023-03-21 NOTE — PRE-PROCEDURE INSTRUCTIONS
OB pre-admit instructions given for upcoming delivery. Patient states understanding of information. Encouraged any questions and concerns. Lab work completed. Patient preference includes breast feeding and would like Dr. Shepard to take care of infant during hospital stay.

## 2023-03-22 ENCOUNTER — ROUTINE PRENATAL (OUTPATIENT)
Dept: OBSTETRICS AND GYNECOLOGY | Facility: CLINIC | Age: 33
End: 2023-03-22
Payer: COMMERCIAL

## 2023-03-22 VITALS — DIASTOLIC BLOOD PRESSURE: 64 MMHG | SYSTOLIC BLOOD PRESSURE: 124 MMHG | WEIGHT: 171.19 LBS | HEART RATE: 90 BPM

## 2023-03-22 DIAGNOSIS — Z3A.37 37 WEEKS GESTATION OF PREGNANCY: ICD-10-CM

## 2023-03-22 DIAGNOSIS — O34.211 MATERNAL CARE DUE TO LOW TRANSVERSE UTERINE SCAR FROM PREVIOUS CESAREAN DELIVERY: Primary | ICD-10-CM

## 2023-03-22 LAB
BILIRUB UR QL STRIP: NEGATIVE
GLUCOSE UR QL STRIP: NEGATIVE
HIV 1+2 AB+HIV1 P24 AG SERPL QL IA: NONREACTIVE
KETONES UR QL STRIP: NEGATIVE
LEUKOCYTE ESTERASE UR QL STRIP: NEGATIVE
PH, POC UA: 7
POC BLOOD, URINE: POSITIVE
POC NITRATES, URINE: NEGATIVE
PROT UR QL STRIP: NEGATIVE
SP GR UR STRIP: 1.01 (ref 1–1.03)
T PALLIDUM AB SER QL: NONREACTIVE
UROBILINOGEN UR STRIP-ACNC: 0.2 (ref 0.1–1.1)

## 2023-03-22 PROCEDURE — 0502F SUBSEQUENT PRENATAL CARE: CPT | Mod: ,,, | Performed by: NURSE PRACTITIONER

## 2023-03-22 PROCEDURE — 87088 URINE BACTERIA CULTURE: CPT | Performed by: NURSE PRACTITIONER

## 2023-03-22 PROCEDURE — 0502F PR SUBSEQUENT PRENATAL CARE: ICD-10-PCS | Mod: ,,, | Performed by: NURSE PRACTITIONER

## 2023-03-22 NOTE — PROGRESS NOTES
HPI  Gena Benavidez is a 33 y.o.   37w6d here for Routine Prenatal Visit (PT PRESENTS TO CLINIC FOR OB VISIT, OTHERWISE NO COMPLAINTS.)      Gena's allergies, medications, history, and problem list were updated as appropriate.    ROS:  A comprehensive review of symptoms was completed and negative except as noted above.    Physical Exam:  /64   Pulse 90   Wt 77.7 kg (171 lb 3 oz)   LMP 2022     Gen: No distress  Abdomen: Gravid, non-tender  Extremities: No edema    Fetal Heart Rate: 137  Movement: Present     Recent Results (from the past 24 hour(s))   Hepatitis B Surface Antigen    Collection Time: 23  1:33 PM   Result Value Ref Range    Hepatitis B Surface Antigen Negative Negative    Hepatitis B Surface Antigen#     HIV 1/2 Ag/Ab (4th Gen)    Collection Time: 23  1:33 PM   Result Value Ref Range    HIV Nonreactive Nonreactive   CBC with Differential    Collection Time: 23  1:33 PM   Result Value Ref Range    WBC 10.0 4.0 - 11.5 x10(3)/mcL    RBC 4.17 4.00 - 5.10 x10(6)/mcL    Hgb 12.7 11.8 - 16.0 g/dL    Hct 37.8 36.0 - 48.0 %    MCV 90.6 79.0 - 99.0 fL    MCH 30.5 27.0 - 34.0 pg    MCHC 33.6 31.0 - 37.0 g/dL    RDW 12.9 11.0 - 14.5 %    Platelet 254 140 - 371 x10(3)/mcL    MPV 9.8 9.4 - 12.4 fL    Neut % 68.9 37 - 73 %    Lymph % 23.6 20 - 55 %    Mono % 6.6 4.7 - 12.5 %    Eos % 0.2 (L) 0.7 - 7 %    Basophil % 0.3 0.1 - 1.2 %    Lymph # 2.37 1.16 - 3.74 x10(3)/mcL    Neut # 6.92 (H) 1.56 - 6.13 x10(3)/mcL    Mono # 0.66 (H) 0.24 - 0.36 x10(3)/mcL    Eos # 0.02 (L) 0.04 - 0.36 x10(3)/mcL    Baso # 0.03 0.01 - 0.08 x10(3)/mcL    IG# 0.04 (H) 0.0001 - 0.031 x10(3)/mcL    IG% 0.4 0 - 0.5 %    NRBC% 0.0 0 - 1 %   SYPHILIS ANTIBODY (WITH REFLEX RPR)    Collection Time: 23  1:33 PM   Result Value Ref Range    Syphilis Antibody Nonreactive Nonreactive, Equivocal   POCT Urinalysis, Dipstick, Automated, W/O Scope    Collection Time: 23 11:07 AM   Result Value Ref  Range    POC Blood, Urine Positive (A) Negative    POC Bilirubin, Urine Negative Negative    POC Urobilinogen, Urine 0.2 0.1 - 1.1    POC Ketones, Urine Negative Negative    POC Protein, Urine Negative Negative    POC Nitrates, Urine Negative Negative    POC Glucose, Urine Negative Negative    pH, UA 7.0     POC Specific Gravity, Urine 1.010 1.003 - 1.029    POC Leukocytes, Urine Negative Negative     Chaperone Present  ASSESSMENT/PLAN:  1. Maternal care due to low transverse uterine scar from previous  delivery  -     POCT Urinalysis, Dipstick, Automated, W/O Scope    2. 37 weeks gestation of pregnancy  -     Urine Culture High Risk      Labor unit, Kick Counts, and Pre-eclampsia given  Chaperone Present  Pt is scheduled for c/s  Follow Up:  1 wk ob fu

## 2023-03-25 LAB — BACTERIA UR CULT: NORMAL

## 2023-03-28 ENCOUNTER — ROUTINE PRENATAL (OUTPATIENT)
Dept: OBSTETRICS AND GYNECOLOGY | Facility: CLINIC | Age: 33
End: 2023-03-28
Payer: COMMERCIAL

## 2023-03-28 VITALS — DIASTOLIC BLOOD PRESSURE: 62 MMHG | WEIGHT: 171.5 LBS | HEART RATE: 76 BPM | SYSTOLIC BLOOD PRESSURE: 106 MMHG

## 2023-03-28 DIAGNOSIS — Z3A.38 38 WEEKS GESTATION OF PREGNANCY: ICD-10-CM

## 2023-03-28 DIAGNOSIS — O34.211 MATERNAL CARE DUE TO LOW TRANSVERSE UTERINE SCAR FROM PREVIOUS CESAREAN DELIVERY: Primary | ICD-10-CM

## 2023-03-28 PROCEDURE — 0502F SUBSEQUENT PRENATAL CARE: CPT | Mod: ,,, | Performed by: NURSE PRACTITIONER

## 2023-03-28 PROCEDURE — 0502F PR SUBSEQUENT PRENATAL CARE: ICD-10-PCS | Mod: ,,, | Performed by: NURSE PRACTITIONER

## 2023-03-28 NOTE — H&P (VIEW-ONLY)
HPI  Gena Benavidez is a 33 y.o.   38w5d here for Routine Prenatal Visit (NO C/Os)      Gena's allergies, medications, history, and problem list were updated as appropriate.    ROS:  A comprehensive review of symptoms was completed and negative except as noted above.    Physical Exam:  /62   Pulse 76   Wt 77.8 kg (171 lb 8.3 oz)   LMP 2022     Gen: No distress  Abdomen: Gravid, non-tender  Extremities: No edema    Movement: Present  Recent Results (from the past 24 hour(s))   POCT Urinalysis, Dipstick, Automated, W/O Scope    Collection Time: 23 12:08 PM   Result Value Ref Range    POC Blood, Urine Positive (A) Negative    POC Bilirubin, Urine Negative Negative    POC Urobilinogen, Urine 0.2 0.1 - 1.1    POC Ketones, Urine Negative Negative    POC Protein, Urine Negative Negative    POC Nitrates, Urine Negative Negative    POC Glucose, Urine Negative Negative    pH, UA 7.0     POC Specific Gravity, Urine 1.015 1.003 - 1.029    POC Leukocytes, Urine Positive (A) Negative     Chaperone Present  ASSESSMENT/PLAN:  1. Maternal care due to low transverse uterine scar from previous  delivery    2. 38 weeks gestation of pregnancy  -     POCT Urinalysis, Dipstick, Automated, W/O Scope  -     Urine Culture High Risk      Labor unit, Kick Counts, and Pre-eclampsia given  Chaperone Present  Follow Up:  No follow-ups on file.   PT DECLINED CERVICAL EXAM

## 2023-03-29 ENCOUNTER — ANESTHESIA EVENT (OUTPATIENT)
Dept: SURGERY | Facility: HOSPITAL | Age: 33
End: 2023-03-29
Payer: COMMERCIAL

## 2023-03-29 NOTE — ANESTHESIA PREPROCEDURE EVALUATION
03/29/2023  Gena Benavidez is a 33 y.o., female.      Pre-op Assessment    I have reviewed the Patient Summary Reports.     I have reviewed the Nursing Notes. I have reviewed the NPO Status.   I have reviewed the Medications.     Review of Systems  Anesthesia Hx:  No problems with previous Anesthesia  Denies Family Hx of Anesthesia complications.   Denies Personal Hx of Anesthesia complications.   Social:  Non-Smoker    Hematology/Oncology:  Hematology Normal   Oncology Normal     EENT/Dental:EENT/Dental Normal   Cardiovascular:  Cardiovascular Normal Exercise tolerance: good     Pulmonary:  Pulmonary Normal    Renal/:  Renal/ Normal     Hepatic/GI:  Hepatic/GI Normal    Musculoskeletal:  Musculoskeletal Normal    Neurological:  Neurology Normal    Endocrine:  Endocrine Normal    Dermatological:  Skin Normal    Psych:  Psychiatric Normal           Physical Exam  General: Well nourished, Cooperative, Alert and Oriented    Airway:  Mallampati: II / II  Mouth Opening: Normal  TM Distance: Normal  Tongue: Normal  Neck ROM: Normal ROM    Dental:  Intact        Anesthesia Plan  Type of Anesthesia, risks & benefits discussed:    Anesthesia Type: Spinal  Intra-op Monitoring Plan: Standard ASA Monitors  Post Op Pain Control Plan: epidural analgesia  Informed Consent: Informed consent signed with the Patient and all parties understand the risks and agree with anesthesia plan.  All questions answered. Patient consented to blood products? Yes  ASA Score: 2  Day of Surgery Review of History & Physical: H&P Update referred to the surgeon/provider.I have interviewed and examined the patient. I have reviewed the patient's H&P dated: There are no significant changes.     Ready For Surgery From Anesthesia Perspective.     .

## 2023-03-30 ENCOUNTER — HOSPITAL ENCOUNTER (INPATIENT)
Facility: HOSPITAL | Age: 33
LOS: 2 days | Discharge: HOME | End: 2023-04-01
Attending: OBSTETRICS & GYNECOLOGY | Admitting: OBSTETRICS & GYNECOLOGY
Payer: COMMERCIAL

## 2023-03-30 ENCOUNTER — ANESTHESIA (OUTPATIENT)
Dept: SURGERY | Facility: HOSPITAL | Age: 33
End: 2023-03-30
Payer: COMMERCIAL

## 2023-03-30 DIAGNOSIS — Z98.891 S/P CESAREAN SECTION: ICD-10-CM

## 2023-03-30 DIAGNOSIS — Z98.891 HISTORY OF CESAREAN SECTION, LOW TRANSVERSE: Primary | ICD-10-CM

## 2023-03-30 LAB
ABO AND RH: NORMAL
ANTIBODY SCREEN: NORMAL
SPECIMEN OUTDATE: NORMAL

## 2023-03-30 PROCEDURE — 59510 PR FULL ROUT OBSTE CARE,CESAREAN DELIV: ICD-10-PCS | Mod: GB,,, | Performed by: OBSTETRICS & GYNECOLOGY

## 2023-03-30 PROCEDURE — 11000001 HC ACUTE MED/SURG PRIVATE ROOM

## 2023-03-30 PROCEDURE — 59510 CESAREAN DELIVERY: CPT | Mod: GB,,, | Performed by: OBSTETRICS & GYNECOLOGY

## 2023-03-30 PROCEDURE — 86900 BLOOD TYPING SEROLOGIC ABO: CPT | Performed by: OBSTETRICS & GYNECOLOGY

## 2023-03-30 PROCEDURE — 25000003 PHARM REV CODE 250: Performed by: NURSE ANESTHETIST, CERTIFIED REGISTERED

## 2023-03-30 PROCEDURE — 37000008 HC ANESTHESIA 1ST 15 MINUTES: Performed by: OBSTETRICS & GYNECOLOGY

## 2023-03-30 PROCEDURE — 59514 CESAREAN DELIVERY ONLY: CPT | Mod: QZ,,, | Performed by: NURSE ANESTHETIST, CERTIFIED REGISTERED

## 2023-03-30 PROCEDURE — 36415 COLL VENOUS BLD VENIPUNCTURE: CPT | Performed by: OBSTETRICS & GYNECOLOGY

## 2023-03-30 PROCEDURE — 63600175 PHARM REV CODE 636 W HCPCS: Performed by: OBSTETRICS & GYNECOLOGY

## 2023-03-30 PROCEDURE — 36000709 HC OR TIME LEV III EA ADD 15 MIN: Performed by: OBSTETRICS & GYNECOLOGY

## 2023-03-30 PROCEDURE — 59514 PRA REAN DELIVERY ONLY: ICD-10-PCS | Mod: QZ,,, | Performed by: NURSE ANESTHETIST, CERTIFIED REGISTERED

## 2023-03-30 PROCEDURE — 71000033 HC RECOVERY, INTIAL HOUR: Performed by: OBSTETRICS & GYNECOLOGY

## 2023-03-30 PROCEDURE — 62326 NJX INTERLAMINAR LMBR/SAC: CPT | Performed by: NURSE ANESTHETIST, CERTIFIED REGISTERED

## 2023-03-30 PROCEDURE — 62322 NJX INTERLAMINAR LMBR/SAC: CPT | Performed by: NURSE ANESTHETIST, CERTIFIED REGISTERED

## 2023-03-30 PROCEDURE — 63600175 PHARM REV CODE 636 W HCPCS: Performed by: NURSE ANESTHETIST, CERTIFIED REGISTERED

## 2023-03-30 PROCEDURE — 37000009 HC ANESTHESIA EA ADD 15 MINS: Performed by: OBSTETRICS & GYNECOLOGY

## 2023-03-30 PROCEDURE — 25000003 PHARM REV CODE 250: Performed by: OBSTETRICS & GYNECOLOGY

## 2023-03-30 PROCEDURE — 36000708 HC OR TIME LEV III 1ST 15 MIN: Performed by: OBSTETRICS & GYNECOLOGY

## 2023-03-30 RX ORDER — OXYTOCIN 10 [USP'U]/ML
10 INJECTION, SOLUTION INTRAMUSCULAR; INTRAVENOUS ONCE AS NEEDED
Status: DISCONTINUED | OUTPATIENT
Start: 2023-03-30 | End: 2023-04-01 | Stop reason: HOSPADM

## 2023-03-30 RX ORDER — OXYTOCIN/RINGER'S LACTATE 30/500 ML
334 PLASTIC BAG, INJECTION (ML) INTRAVENOUS ONCE AS NEEDED
Status: DISCONTINUED | OUTPATIENT
Start: 2023-03-30 | End: 2023-04-01 | Stop reason: HOSPADM

## 2023-03-30 RX ORDER — OXYTOCIN/RINGER'S LACTATE 30/500 ML
95 PLASTIC BAG, INJECTION (ML) INTRAVENOUS ONCE
Status: DISCONTINUED | OUTPATIENT
Start: 2023-03-30 | End: 2023-04-01 | Stop reason: HOSPADM

## 2023-03-30 RX ORDER — PRENATAL WITH FERROUS FUM AND FOLIC ACID 3080; 920; 120; 400; 22; 1.84; 3; 20; 10; 1; 12; 200; 27; 25; 2 [IU]/1; [IU]/1; MG/1; [IU]/1; MG/1; MG/1; MG/1; MG/1; MG/1; MG/1; UG/1; MG/1; MG/1; MG/1; MG/1
1 TABLET ORAL DAILY
Status: DISCONTINUED | OUTPATIENT
Start: 2023-03-30 | End: 2023-04-01 | Stop reason: HOSPADM

## 2023-03-30 RX ORDER — DOCUSATE SODIUM 100 MG/1
200 CAPSULE, LIQUID FILLED ORAL 2 TIMES DAILY
Status: DISCONTINUED | OUTPATIENT
Start: 2023-03-30 | End: 2023-04-01 | Stop reason: HOSPADM

## 2023-03-30 RX ORDER — MISOPROSTOL 100 UG/1
800 TABLET ORAL
Status: DISCONTINUED | OUTPATIENT
Start: 2023-03-30 | End: 2023-03-30

## 2023-03-30 RX ORDER — ONDANSETRON 2 MG/ML
INJECTION INTRAMUSCULAR; INTRAVENOUS
Status: DISCONTINUED | OUTPATIENT
Start: 2023-03-30 | End: 2023-03-30

## 2023-03-30 RX ORDER — OXYTOCIN/RINGER'S LACTATE 30/500 ML
95 PLASTIC BAG, INJECTION (ML) INTRAVENOUS ONCE AS NEEDED
Status: DISCONTINUED | OUTPATIENT
Start: 2023-03-30 | End: 2023-04-01 | Stop reason: HOSPADM

## 2023-03-30 RX ORDER — FAMOTIDINE 10 MG/ML
20 INJECTION INTRAVENOUS
Status: DISCONTINUED | OUTPATIENT
Start: 2023-03-30 | End: 2023-04-01 | Stop reason: HOSPADM

## 2023-03-30 RX ORDER — METHYLERGONOVINE MALEATE 0.2 MG/ML
200 INJECTION INTRAVENOUS
Status: DISCONTINUED | OUTPATIENT
Start: 2023-03-30 | End: 2023-04-01 | Stop reason: HOSPADM

## 2023-03-30 RX ORDER — OXYTOCIN/RINGER'S LACTATE 30/500 ML
95 PLASTIC BAG, INJECTION (ML) INTRAVENOUS ONCE
Status: COMPLETED | OUTPATIENT
Start: 2023-03-30 | End: 2023-03-30

## 2023-03-30 RX ORDER — IBUPROFEN 400 MG/1
800 TABLET ORAL EVERY 8 HOURS
Status: DISCONTINUED | OUTPATIENT
Start: 2023-03-31 | End: 2023-04-01 | Stop reason: HOSPADM

## 2023-03-30 RX ORDER — MISOPROSTOL 100 UG/1
800 TABLET ORAL ONCE AS NEEDED
Status: DISCONTINUED | OUTPATIENT
Start: 2023-03-30 | End: 2023-04-01 | Stop reason: HOSPADM

## 2023-03-30 RX ORDER — CARBOPROST TROMETHAMINE 250 UG/ML
250 INJECTION, SOLUTION INTRAMUSCULAR
Status: DISCONTINUED | OUTPATIENT
Start: 2023-03-30 | End: 2023-04-01 | Stop reason: HOSPADM

## 2023-03-30 RX ORDER — SIMETHICONE 80 MG
1 TABLET,CHEWABLE ORAL EVERY 6 HOURS PRN
Status: DISCONTINUED | OUTPATIENT
Start: 2023-03-30 | End: 2023-04-01 | Stop reason: HOSPADM

## 2023-03-30 RX ORDER — MUPIROCIN 20 MG/G
OINTMENT TOPICAL 2 TIMES DAILY
Status: DISCONTINUED | OUTPATIENT
Start: 2023-03-30 | End: 2023-04-01 | Stop reason: HOSPADM

## 2023-03-30 RX ORDER — OXYTOCIN/RINGER'S LACTATE 20/1000 ML
PLASTIC BAG, INJECTION (ML) INTRAVENOUS
Status: COMPLETED | OUTPATIENT
Start: 2023-03-30 | End: 2023-03-30

## 2023-03-30 RX ORDER — ONDANSETRON 4 MG/1
8 TABLET, ORALLY DISINTEGRATING ORAL EVERY 8 HOURS PRN
Status: DISCONTINUED | OUTPATIENT
Start: 2023-03-30 | End: 2023-04-01 | Stop reason: HOSPADM

## 2023-03-30 RX ORDER — PROCHLORPERAZINE EDISYLATE 5 MG/ML
5 INJECTION INTRAMUSCULAR; INTRAVENOUS EVERY 6 HOURS PRN
Status: DISCONTINUED | OUTPATIENT
Start: 2023-03-30 | End: 2023-04-01 | Stop reason: HOSPADM

## 2023-03-30 RX ORDER — HYDROCODONE BITARTRATE AND ACETAMINOPHEN 5; 325 MG/1; MG/1
1 TABLET ORAL EVERY 4 HOURS PRN
Status: DISCONTINUED | OUTPATIENT
Start: 2023-03-30 | End: 2023-04-01 | Stop reason: HOSPADM

## 2023-03-30 RX ORDER — BISACODYL 10 MG
10 SUPPOSITORY, RECTAL RECTAL ONCE AS NEEDED
Status: DISCONTINUED | OUTPATIENT
Start: 2023-03-30 | End: 2023-04-01 | Stop reason: HOSPADM

## 2023-03-30 RX ORDER — TRANEXAMIC ACID 10 MG/ML
1000 INJECTION, SOLUTION INTRAVENOUS ONCE AS NEEDED
Status: DISCONTINUED | OUTPATIENT
Start: 2023-03-30 | End: 2023-04-01 | Stop reason: HOSPADM

## 2023-03-30 RX ORDER — SODIUM CHLORIDE, SODIUM LACTATE, POTASSIUM CHLORIDE, CALCIUM CHLORIDE 600; 310; 30; 20 MG/100ML; MG/100ML; MG/100ML; MG/100ML
INJECTION, SOLUTION INTRAVENOUS CONTINUOUS
Status: DISCONTINUED | OUTPATIENT
Start: 2023-03-30 | End: 2023-03-31

## 2023-03-30 RX ORDER — OXYTOCIN/RINGER'S LACTATE 30/500 ML
334 PLASTIC BAG, INJECTION (ML) INTRAVENOUS ONCE
Status: DISCONTINUED | OUTPATIENT
Start: 2023-03-30 | End: 2023-04-01 | Stop reason: HOSPADM

## 2023-03-30 RX ORDER — MISOPROSTOL 100 UG/1
800 TABLET ORAL ONCE AS NEEDED
Status: DISCONTINUED | OUTPATIENT
Start: 2023-03-30 | End: 2023-03-30

## 2023-03-30 RX ORDER — DIPHENHYDRAMINE HCL 25 MG
25 CAPSULE ORAL EVERY 4 HOURS PRN
Status: DISCONTINUED | OUTPATIENT
Start: 2023-03-30 | End: 2023-04-01 | Stop reason: HOSPADM

## 2023-03-30 RX ORDER — MUPIROCIN 20 MG/G
OINTMENT TOPICAL
Status: CANCELLED | OUTPATIENT
Start: 2023-03-30

## 2023-03-30 RX ORDER — AMOXICILLIN 250 MG
1 CAPSULE ORAL NIGHTLY PRN
Status: DISCONTINUED | OUTPATIENT
Start: 2023-03-30 | End: 2023-04-01 | Stop reason: HOSPADM

## 2023-03-30 RX ORDER — SODIUM CHLORIDE 0.9 % (FLUSH) 0.9 %
10 SYRINGE (ML) INJECTION
Status: DISCONTINUED | OUTPATIENT
Start: 2023-03-30 | End: 2023-04-01 | Stop reason: HOSPADM

## 2023-03-30 RX ORDER — SODIUM CITRATE AND CITRIC ACID MONOHYDRATE 334; 500 MG/5ML; MG/5ML
30 SOLUTION ORAL
Status: DISCONTINUED | OUTPATIENT
Start: 2023-03-30 | End: 2023-04-01 | Stop reason: HOSPADM

## 2023-03-30 RX ORDER — OXYCODONE AND ACETAMINOPHEN 10; 325 MG/1; MG/1
1 TABLET ORAL EVERY 4 HOURS PRN
Status: DISCONTINUED | OUTPATIENT
Start: 2023-03-30 | End: 2023-04-01 | Stop reason: HOSPADM

## 2023-03-30 RX ORDER — KETOROLAC TROMETHAMINE 30 MG/ML
30 INJECTION, SOLUTION INTRAMUSCULAR; INTRAVENOUS EVERY 8 HOURS
Status: COMPLETED | OUTPATIENT
Start: 2023-03-30 | End: 2023-03-31

## 2023-03-30 RX ORDER — BUPIVACAINE HYDROCHLORIDE 7.5 MG/ML
INJECTION, SOLUTION EPIDURAL; RETROBULBAR
Status: COMPLETED | OUTPATIENT
Start: 2023-03-30 | End: 2023-03-30

## 2023-03-30 RX ORDER — ADHESIVE BANDAGE
30 BANDAGE TOPICAL 2 TIMES DAILY PRN
Status: DISCONTINUED | OUTPATIENT
Start: 2023-03-31 | End: 2023-04-01 | Stop reason: HOSPADM

## 2023-03-30 RX ADMIN — SODIUM CHLORIDE, POTASSIUM CHLORIDE, SODIUM LACTATE AND CALCIUM CHLORIDE 1000 ML: 600; 310; 30; 20 INJECTION, SOLUTION INTRAVENOUS at 05:03

## 2023-03-30 RX ADMIN — CEFAZOLIN 2 G: 1 INJECTION, POWDER, FOR SOLUTION INTRAMUSCULAR; INTRAVENOUS at 07:03

## 2023-03-30 RX ADMIN — ONDANSETRON 4 MG: 2 INJECTION INTRAMUSCULAR; INTRAVENOUS at 07:03

## 2023-03-30 RX ADMIN — Medication 999 ML/HR: at 07:03

## 2023-03-30 RX ADMIN — BUPIVACAINE HYDROCHLORIDE 1.9 ML: 7.5 INJECTION, SOLUTION EPIDURAL; RETROBULBAR at 07:03

## 2023-03-30 RX ADMIN — KETOROLAC TROMETHAMINE 30 MG: 30 INJECTION, SOLUTION INTRAMUSCULAR at 02:03

## 2023-03-30 RX ADMIN — OXYCODONE AND ACETAMINOPHEN 1 TABLET: 10; 325 TABLET ORAL at 10:03

## 2023-03-30 RX ADMIN — OXYCODONE AND ACETAMINOPHEN 1 TABLET: 10; 325 TABLET ORAL at 05:03

## 2023-03-30 RX ADMIN — KETOROLAC TROMETHAMINE 30 MG: 30 INJECTION, SOLUTION INTRAMUSCULAR at 09:03

## 2023-03-30 RX ADMIN — SODIUM CHLORIDE, POTASSIUM CHLORIDE, SODIUM LACTATE AND CALCIUM CHLORIDE: 600; 310; 30; 20 INJECTION, SOLUTION INTRAVENOUS at 07:03

## 2023-03-30 RX ADMIN — DOCUSATE SODIUM 200 MG: 100 CAPSULE, LIQUID FILLED ORAL at 09:03

## 2023-03-30 NOTE — ANESTHESIA POSTPROCEDURE EVALUATION
Anesthesia Post Evaluation    Patient: Gena Benavidez    Procedure(s) Performed: Procedure(s) (LRB):   SECTION (N/A)    Final Anesthesia Type: spinal      Patient location during evaluation: PACU  Patient participation: Yes- Able to Participate  Level of consciousness: awake and alert, awake and oriented  Post-procedure vital signs: reviewed and stable  Pain management: adequate  Airway patency: patent  CECILIO mitigation strategies: Preoperative initiation of continuous positive airway pressure (CPAP) or non-invasive positive pressure ventilation (NIPPV), Multimodal analgesia and Use of major conduction anesthesia (spinal/epidural) or peripheral nerve block  PONV status at discharge: No PONV  Anesthetic complications: no      Cardiovascular status: blood pressure returned to baseline  Respiratory status: unassisted, room air and spontaneous ventilation  Hydration status: euvolemic  Follow-up not needed.          Vitals Value Taken Time   /56 23 0826   Temp 97.3 23 0828   Pulse 73 23 0827   Resp 17 23 0828   SpO2 100 % 23 0827   Vitals shown include unvalidated device data.      No case tracking events are documented in the log.      Pain/Espinoza Score: No data recorded

## 2023-03-30 NOTE — ANESTHESIA POSTPROCEDURE EVALUATION
Anesthesia Post Evaluation    Patient: Gena Benavidez    Procedure(s) Performed: Procedure(s) (LRB):   SECTION (N/A)    [facility] Anesthesia Post Op Evaluation      Vitals Value Taken Time   /56 23 0826   Temp 97.3 23 0828   Pulse 73 23 0827   Resp 17 23 0828   SpO2 100 % 23   Vitals shown include unvalidated device data.      No case tracking events are documented in the log.      Pain/Espinoza Score: No data recorded

## 2023-03-30 NOTE — ANESTHESIA PROCEDURE NOTES
Spinal    Patient location during procedure: OB   Reason for block: primary anesthetic   Reason for block: at surgeon's request, post-op pain management  Diagnosis: Active Labor   Start time: 3/30/2023 7:21 AM  Timeout: 3/30/2023 7:17 AM  End time: 3/30/2023 7:29 AM    Staffing  Performing Provider: Juma Gagnon CRNA  Authorizing Provider: Juma Gagnon CRNA        Preanesthetic Checklist  Completed: patient identified, IV checked, site marked, surgical consent, monitors and equipment checked, pre-op evaluation, timeout performed, anesthesia consent given, hand hygiene performed and patient being monitored  Preparation  Patient position: sitting  Prep: ChloraPrep  Reason for block: primary anesthetic   Epidural  Skin Anesthetic: lidocaine 1%  Administration type: single shot  Approach: midline  Interspace: L4-5    Block type: caudal.  Needle and Epidural Catheter  Needle type: Tuohy   Needle gauge: 18  Needle length: 5.0 inches  Catheter type: multi-orifice  Catheter size: 20 G  Insertion Attempts: 1  Test dose: 3 mL of lidocaine 1.5% with Epi 1-to-200,000  Additional Documentation: incremental injection, negative aspiration for heme and CSF and no paresthesia on injection  Needle localization: anatomical landmarks  Assessment  Ease of block: easy  Patient's tolerance of the procedure: comfortable throughout block No inadvertent dural puncture with Tuohy.  Dural puncture not performed with spinal needle

## 2023-03-30 NOTE — ANESTHESIA PROCEDURE NOTES
Spinal    Diagnosis: repeat c/s  Patient location during procedure: OB  Start time: 3/30/2023 7:21 AM  Timeout: 3/30/2023 7:17 AM  End time: 3/30/2023 7:29 AM    Staffing  Authorizing Provider: Juma Gagnon CRNA  Performing Provider: Juma Gagnon CRNA    Preanesthetic Checklist  Completed: patient identified, IV checked, site marked, risks and benefits discussed, surgical consent, monitors and equipment checked, pre-op evaluation and timeout performed  Spinal Block  Patient position: sitting  Prep: ChloraPrep  Patient monitoring: heart rate, cardiac monitor, continuous pulse ox, continuous capnometry and frequent blood pressure checks  Approach: midline  Location: L4-5  Injection technique: single shot  CSF Fluid: clear free-flowing CSF  Needle  Needle type: pencil-tip   Needle gauge: 25 G  Needle length: 3.5 in  Needle localization: anatomical landmarks  Assessment  Sensory level: T9  Ease of block: easy  Patient's tolerance of the procedure: comfortable throughout block  Medications:    Medications: bupivacaine (pf) (MARCAINE) injection 0.75% - Intraspinal   1.9 mL - 3/30/2023 7:29:00 AM

## 2023-03-30 NOTE — OP NOTE
[facility]   Section   Operative Note    SUMMARY     Date of Procedure: 3/30/2023     Procedure: Procedure(s) (LRB):   SECTION (N/A)    Surgeon(s) and Role:     * Doug Adorno MD - Primary    Assisting Surgeon: None    Pre-Operative Diagnosis: Encounter for maternal care for low transverse scar from previous  delivery [O34.211]    Post-Operative Diagnosis: Post-Op Diagnosis Codes:     * Encounter for maternal care for low transverse scar from previous  delivery [O34.211]    Anesthesia: Spinal/Epidural        Description of the Findings of the Procedure:   The patient was taken to the OR after the all the risks, benefits, and alternatives to surgery were discussed with her. She was given spinal anesthesia by the anesthetist. With patient in supine position, the legs are  and Hung Catheter placed.  She was then prepped with Chloroprep with 3 minute drying time and draped by the scrub tech.    Time out taken with OR team members.  Pfannenstiel Incision made through the skin, transverse fascial incision developed, rectus muscles  in the midline and the peritoneum entered.   There were no adhesions noted.  The lower uterine segment and position of the fetus identified.   Bladder flap taken down through transverse peritoneal incision.    Low Transverse Incision made through well developed lower uterine segment and extended laterally with blunt dissection.   Clear fluid noted.  Infant delivered from vertex presentation.  Cord clamped after one minute and  handed to attending nurse.  Cord blood taken, placenta delivered.  The uterus was externalized.  The edges of the uterine incision are grasped with Saxena clamps at the angles and the inferior and superior midline edges of the incision.    Closure of the hysterotomy with running lock 1 Monocryl.    Observation for bleeding with suture of any bleeding along the hysterotomy line.    With good hemostasis noted, the anterior pelvis is rinsed with sterile saline.   Right and left adnexa with normal anatomy.  The uterus was returned to the abdomen.  Closure of the abdomen with 0 Vicryl running of the peritoneum , 0 Vicryl of the fascia starting at the left angle and tying at the right angle. The muscles were reapproximated with 0 Vicryl as well.   Skin closure with 3 0 Vicryl subcuticular with a straight Dk needle.  The incision was then covered with Dermabond.      Significant Surgical Tasks Conducted by the Assistant(s), if Applicable: N/A    Complications: No    Estimated Blood Loss (EBL): 450 mL           Implants: * No implants in log *    Specimens: NONE  Specimen (24h ago, onward)      None            Condition: Good    Disposition: PACU - hemodynamically stable.    Attestation: Good

## 2023-03-31 LAB
BACTERIA UR CULT: NORMAL
BASOPHILS # BLD AUTO: 0.02 X10(3)/MCL (ref 0.01–0.08)
BASOPHILS NFR BLD AUTO: 0.2 % (ref 0.1–1.2)
EOSINOPHIL # BLD AUTO: 0.05 X10(3)/MCL (ref 0.04–0.36)
EOSINOPHIL NFR BLD AUTO: 0.5 % (ref 0.7–7)
ERYTHROCYTE [DISTWIDTH] IN BLOOD BY AUTOMATED COUNT: 12.9 % (ref 11–14.5)
HCT VFR BLD AUTO: 35.8 % (ref 36–48)
HGB BLD-MCNC: 12 G/DL (ref 11.8–16)
IMM GRANULOCYTES # BLD AUTO: 0.02 X10(3)/MCL (ref 0–0.03)
IMM GRANULOCYTES NFR BLD AUTO: 0.2 % (ref 0–0.5)
LYMPHOCYTES # BLD AUTO: 2.43 X10(3)/MCL (ref 1.16–3.74)
LYMPHOCYTES NFR BLD AUTO: 23.8 % (ref 20–55)
MCH RBC QN AUTO: 30.7 PG (ref 27–34)
MCV RBC AUTO: 91.6 FL (ref 79–99)
MEAN CELL HEMOGLOBIN CONCENTRATION (OHS) G/DL: 33.5 G/DL (ref 31–37)
MONOCYTES # BLD AUTO: 0.82 X10(3)/MCL (ref 0.24–0.36)
MONOCYTES NFR BLD AUTO: 8 % (ref 4.7–12.5)
NEUTROPHILS # BLD AUTO: 6.87 X10(3)/MCL (ref 1.56–6.13)
NEUTROPHILS NFR BLD AUTO: 67.3 % (ref 37–73)
PLATELET # BLD AUTO: 205 X10(3)/MCL (ref 140–371)
PMV BLD AUTO: 9.7 FL (ref 9.4–12.4)
RBC # BLD AUTO: 3.91 X10(6)/MCL (ref 4–5.1)
WBC # SPEC AUTO: 10.2 X10(3)/MCL (ref 4–11.5)

## 2023-03-31 PROCEDURE — 25000003 PHARM REV CODE 250: Performed by: OBSTETRICS & GYNECOLOGY

## 2023-03-31 PROCEDURE — 85025 COMPLETE CBC W/AUTO DIFF WBC: CPT | Performed by: OBSTETRICS & GYNECOLOGY

## 2023-03-31 PROCEDURE — 11000001 HC ACUTE MED/SURG PRIVATE ROOM

## 2023-03-31 PROCEDURE — 63600175 PHARM REV CODE 636 W HCPCS: Performed by: OBSTETRICS & GYNECOLOGY

## 2023-03-31 PROCEDURE — 36415 COLL VENOUS BLD VENIPUNCTURE: CPT | Performed by: OBSTETRICS & GYNECOLOGY

## 2023-03-31 RX ADMIN — DOCUSATE SODIUM 200 MG: 100 CAPSULE, LIQUID FILLED ORAL at 09:03

## 2023-03-31 RX ADMIN — IBUPROFEN 800 MG: 400 TABLET ORAL at 09:03

## 2023-03-31 RX ADMIN — IBUPROFEN 800 MG: 400 TABLET ORAL at 02:03

## 2023-03-31 RX ADMIN — KETOROLAC TROMETHAMINE 30 MG: 30 INJECTION, SOLUTION INTRAMUSCULAR at 05:03

## 2023-03-31 NOTE — PROGRESS NOTES
Today is postop day 1 status post repeat .  Patient is doing great.  She remains afebrile with stable vital signs.  She is ambulating and voiding well.  She is tolerating her diet with passage of flatus.  She is breastfeeding with no breasts complaints.  Yesterday she had a repeat  at which time a 7 lb 14 oz white male with Apgar of 9/9 was delivered.  Patient's blood type is B positive.  Physical exam-blood pressure 111/43, pulse 70, respirations 20, temperature 97.8°.  Lungs-clear to auscultation.  Breasts-no engorgement or tenderness.  Abdomen is soft, flat, mildly tender, active bowel sounds, firm fundus, incision looks good.  Lochia -minimal and non foul-smelling.  Extremities-+1 pitting edema but no tenderness.  Postop day 1 hematocrit is 35.8%.  Impression-postop day 1 doing great.  Plan- routine postop care.

## 2023-03-31 NOTE — PLAN OF CARE
03/31/23 0943   OB Discharge Planning Assessment   Assessment Type Discharge Planning Assessment   Source of Information patient   Verified Demographic and Insurance Information Yes   Insurance Commercial   Commercial BCBS OOS   Pastoral Care/Clergy/ Contact Status none needed   People in Home spouse;child(rolando), dependent   Name(s) of People in Home Brennan Benavidez   Number people in home 4   Relationship Status    Name of Support/Comfort Primary Source Brennan Benavidez   Other children (include names and ages) ages 4 and 2   Employer [employer]   Job Title realtor   Currently Enrolled in School No   Highest Level of Education Bachelor's Degree   Father's Involvement Fully Involved   Is Father signing the birth certificate Yes   Father's Address same   Father Currently Enrolled in School No   Father's Employer [employer]   Father's Job Title    Family Involvement High   Primary Contact Name and Number Brennan Benavidez [phone]   Received Prenatal Care Yes   Transportation Anticipated car, drives self   Receive WIC Benefits N/A    Arrangements Family   Adoption Planned no   Previous Breastfeeding Experience yes   Breast Pump Needed no   Does baby have crib or safe sleep space? Yes   Do you have a car seat? Yes   Has other essential care items? Bottles;Diapers;Clothing   Pediatrician    Resource/Environmental Concerns none   Equipment Currently Used at Home none   Potential Discharge Needs None   DME Needed Upon Discharge  none   DCFS No indications (Indicators for Report)   Discharge Plan A Home with family   Do you have any problems affording any of your prescribed medications? No   Physical Activity   On average, how many days per week do you engage in moderate to strenuous exercise (like a brisk walk)? 0 days   On average, how many minutes do you engage in exercise at this level? 0 min   Financial Resource Strain   How hard is it for you to pay for the very basics  like food, housing, medical care, and heating? Not hard   Housing Stability   In the last 12 months, was there a time when you were not able to pay the mortgage or rent on time? N   In the last 12 months, how many places have you lived? 1   In the last 12 months, was there a time when you did not have a steady place to sleep or slept in a shelter (including now)? N   Transportation Needs   In the past 12 months, has lack of transportation kept you from medical appointments or from getting medications? no   In the past 12 months, has lack of transportation kept you from meetings, work, or from getting things needed for daily living? No   Food Insecurity   Within the past 12 months, you worried that your food would run out before you got the money to buy more. Never true   Within the past 12 months, the food you bought just didn't last and you didn't have money to get more. Never true   Stress   Do you feel stress - tense, restless, nervous, or anxious, or unable to sleep at night because your mind is troubled all the time - these days? Not at all   Social Connections   In a typical week, how many times do you talk on the phone with family, friends, or neighbors? More than 3   How often do you get together with friends or relatives? More than 3   How often do you attend Scientologist or Caodaism services? Never   Do you belong to any clubs or organizations such as Scientologist groups, unions, fraternal or athletic groups, or school groups? No   How often do you attend meetings of the clubs or organizations you belong to? Never   Are you , , , , never , or living with a partner?    Alcohol Use   Q1: How often do you have a drink containing alcohol? Never   Q2: How many drinks containing alcohol do you have on a typical day when you are drinking? None   Q3: How often do you have six or more drinks on one occasion? Never

## 2023-04-01 VITALS
HEART RATE: 76 BPM | HEIGHT: 62 IN | WEIGHT: 171 LBS | SYSTOLIC BLOOD PRESSURE: 104 MMHG | OXYGEN SATURATION: 99 % | DIASTOLIC BLOOD PRESSURE: 69 MMHG | BODY MASS INDEX: 31.47 KG/M2 | RESPIRATION RATE: 20 BRPM | TEMPERATURE: 98 F

## 2023-04-01 PROCEDURE — 25000003 PHARM REV CODE 250: Performed by: OBSTETRICS & GYNECOLOGY

## 2023-04-01 RX ORDER — IBUPROFEN 600 MG/1
600 TABLET ORAL EVERY 6 HOURS PRN
Qty: 25 TABLET | Refills: 0 | Status: SHIPPED | OUTPATIENT
Start: 2023-04-01

## 2023-04-01 RX ADMIN — IBUPROFEN 800 MG: 400 TABLET ORAL at 06:04

## 2023-04-01 NOTE — NURSING
PATIENT INSTRUCTED ON DISCHARGE INSTRUCTIONS. PATIENT VERBALIZED UNDERSTANDING. PATIENT DISCHARGED IN STABLE CONDITION WITH FAMILY BY SIDE.

## 2023-04-01 NOTE — DISCHARGE SUMMARY
Discharge note  Patient is a 33-year-old white female  5 para 3 who underwent a repeat  section on .  A viable 7 lb 14 oz male with Apgar of 9/9 was delivered.  No complications ensued at time of surgery.  Postoperatively the patient has done great.  She has remained afebrile with stable vital signs.  She is ambulating and voiding well.  She is tolerating her diet with passage of flatus.  She is breastfeeding with no breast complaints.  Today is postop day 2 and she is ready to be discharged home.  Physical exam-blood pressure 104/69, pulse 99, respirations 20, temperature 97.6°.  Lungs-clear to auscultation.  Breasts-no engorgement or tenderness.  Abdomen-soft, flat, nontender, firm fundus, active bowel sounds, incision looks good.  Lochia- minimal and non foul-smelling.  Extremities-trace edema but no tenderness.  Postop hematocrit -35.8%.  Impression-postop day 2 status post repeat  doing well.  Plan-discharge patient to home and have her return to clinic in 2 weeks for follow-up.  Discharge meds-prenatal vitamins 1 by mouth daily while breastfeeding, ibuprofen 600 mg by mouth q.6 hours p.r.n. pain.  Instructions-patient needs to notify MD for fever, heavy vaginal bleeding, increasing abdominal pain, or signs of wound infection.  She needs to refrain from intercourse, heavy lifting, or strenuous exercise for 6 weeks postop.

## 2023-04-17 ENCOUNTER — OFFICE VISIT (OUTPATIENT)
Dept: OBSTETRICS AND GYNECOLOGY | Facility: CLINIC | Age: 33
End: 2023-04-17
Payer: COMMERCIAL

## 2023-04-17 VITALS
HEART RATE: 72 BPM | WEIGHT: 158 LBS | DIASTOLIC BLOOD PRESSURE: 64 MMHG | SYSTOLIC BLOOD PRESSURE: 108 MMHG | BODY MASS INDEX: 29.08 KG/M2 | HEIGHT: 62 IN

## 2023-04-17 DIAGNOSIS — Z09 POSTOP CHECK: Primary | ICD-10-CM

## 2023-04-17 PROCEDURE — 1159F MED LIST DOCD IN RCRD: CPT | Mod: CPTII,,, | Performed by: NURSE PRACTITIONER

## 2023-04-17 PROCEDURE — 1159F PR MEDICATION LIST DOCUMENTED IN MEDICAL RECORD: ICD-10-PCS | Mod: CPTII,,, | Performed by: NURSE PRACTITIONER

## 2023-04-17 PROCEDURE — 1160F RVW MEDS BY RX/DR IN RCRD: CPT | Mod: CPTII,,, | Performed by: NURSE PRACTITIONER

## 2023-04-17 PROCEDURE — 0503F PR POSTPARTUM CARE VISIT: ICD-10-PCS | Mod: ,,, | Performed by: NURSE PRACTITIONER

## 2023-04-17 PROCEDURE — 3008F PR BODY MASS INDEX (BMI) DOCUMENTED: ICD-10-PCS | Mod: CPTII,,, | Performed by: NURSE PRACTITIONER

## 2023-04-17 PROCEDURE — 3074F PR MOST RECENT SYSTOLIC BLOOD PRESSURE < 130 MM HG: ICD-10-PCS | Mod: CPTII,,, | Performed by: NURSE PRACTITIONER

## 2023-04-17 PROCEDURE — 3078F DIAST BP <80 MM HG: CPT | Mod: CPTII,,, | Performed by: NURSE PRACTITIONER

## 2023-04-17 PROCEDURE — 3078F PR MOST RECENT DIASTOLIC BLOOD PRESSURE < 80 MM HG: ICD-10-PCS | Mod: CPTII,,, | Performed by: NURSE PRACTITIONER

## 2023-04-17 PROCEDURE — 3008F BODY MASS INDEX DOCD: CPT | Mod: CPTII,,, | Performed by: NURSE PRACTITIONER

## 2023-04-17 PROCEDURE — 3074F SYST BP LT 130 MM HG: CPT | Mod: CPTII,,, | Performed by: NURSE PRACTITIONER

## 2023-04-17 PROCEDURE — 1160F PR REVIEW ALL MEDS BY PRESCRIBER/CLIN PHARMACIST DOCUMENTED: ICD-10-PCS | Mod: CPTII,,, | Performed by: NURSE PRACTITIONER

## 2023-04-17 PROCEDURE — 0503F POSTPARTUM CARE VISIT: CPT | Mod: ,,, | Performed by: NURSE PRACTITIONER

## 2023-04-17 NOTE — PROGRESS NOTES
"  Subjective:      Gena Benavidez is a 33 y.o.  who presents to the clinic 2 weeks status post . The patient is not having any pain. Post-op Evaluation (2 week postop , breast feeding going well, denies any pain. Bowel/bladder/appetite WNL. Denies SI/HI.     Past Medical History:   Diagnosis Date    MTHFR gene mutation      Past Surgical History:   Procedure Laterality Date     SECTION       SECTION N/A 3/30/2023    Procedure:  SECTION;  Surgeon: Doug Adorno MD;  Location: [facility];  Service: OB/GYN;  Laterality: N/A;    DILATION AND CURETTAGE OF UTERUS      MOUTH SURGERY       Review of patient's allergies indicates:   Allergen Reactions    Adhesive Rash    Boostrix tdap [diphth,pertus(acell),tetanus] Rash     OB History    Para Term  AB Living   5 3 3   2 3   SAB IAB Ectopic Multiple Live Births         0 3      # Outcome Date GA Lbr Dm/2nd Weight Sex Delivery Anes PTL Lv   5 Term 23 39w0d  3.572 kg (7 lb 14 oz) M CS-LTranv Spinal  KAT   4 Term 21 39w0d  3.033 kg (6 lb 11 oz) F  EPI  KAT   3 Term 19 39w0d  3.374 kg (7 lb 7 oz) F CS-LTranv EPI  KAT   2 AB 2018 8w0d       FD   1 AB 2017 7w0d       FD     Social History     Tobacco Use    Smoking status: Never     Passive exposure: Never    Smokeless tobacco: Never   Substance Use Topics    Alcohol use: Not Currently    Drug use: Never     Family History   Problem Relation Age of Onset    Osteoporosis Paternal Grandmother        Current Outpatient Medications:     ibuprofen (ADVIL,MOTRIN) 600 MG tablet, Take 1 tablet (600 mg total) by mouth every 6 (six) hours as needed for Other (pain). (Patient not taking: Reported on 2023), Disp: 25 tablet, Rfl: 0    PNV Comb No.59/Iron/FA/DHA (PRENATAL-DHA ORAL), Take by mouth., Disp: , Rfl:       A comprehensive review of symptoms was completed and negative except as noted above.  Objective:     /64   Pulse 72   Ht 5' 2" " (1.575 m)   Wt 71.7 kg (158 lb)   LMP 06/30/2022   Breastfeeding Yes   BMI 28.90 kg/m²     General Appearance: alert, in no acute distress, normal, well nourished.    Gastrointestinal:  Abdomen: no masses. no tender, nondistended.  Liver and spleen: normal  Hernias: no hernias present, no inguinal adenopathy.    Wound Site: clean, dry and intact without erythema or induration. Steri strips still in pace, pt reports he did not have allergic reaction like she did with dermabond.   Assessment:     Postop check      No results found for this or any previous visit (from the past 24 hour(s)).    Plan:   Rtc 2 weeks. In addition to her scheduled follow up, the pt has also been instructed to follow up on as needed basis.

## 2023-05-03 ENCOUNTER — POSTPARTUM VISIT (OUTPATIENT)
Dept: OBSTETRICS AND GYNECOLOGY | Facility: CLINIC | Age: 33
End: 2023-05-03
Payer: COMMERCIAL

## 2023-05-03 VITALS
DIASTOLIC BLOOD PRESSURE: 60 MMHG | SYSTOLIC BLOOD PRESSURE: 122 MMHG | WEIGHT: 157.88 LBS | HEART RATE: 77 BPM | BODY MASS INDEX: 29.05 KG/M2 | HEIGHT: 62 IN | OXYGEN SATURATION: 98 % | RESPIRATION RATE: 17 BRPM

## 2023-05-03 PROCEDURE — 0503F PR POSTPARTUM CARE VISIT: ICD-10-PCS | Mod: CPTII,,, | Performed by: OBSTETRICS & GYNECOLOGY

## 2023-05-03 PROCEDURE — 0503F POSTPARTUM CARE VISIT: CPT | Mod: CPTII,,, | Performed by: OBSTETRICS & GYNECOLOGY

## 2023-05-03 NOTE — PROGRESS NOTES
"  Subjective:      Gena Benavidez is a 33 y.o.  who presents for a postpartum visit.  She is status post  repeat  delivery 4 weeks ago.  Her hospitalization was not complicated.  She is breastfeeding.  She desires no method for contraception.  She denies signs and symptoms of postpartum depression. Her last pap was on 2022. Post-op Evaluation (No c/o's. )    Past Medical History:   Diagnosis Date    MTHFR gene mutation      Past Surgical History:   Procedure Laterality Date     SECTION       SECTION N/A 3/30/2023    Procedure:  SECTION;  Surgeon: Doug Adorno MD;  Location: [facility] OR;  Service: OB/GYN;  Laterality: N/A;    DILATION AND CURETTAGE OF UTERUS      MOUTH SURGERY       Social History     Tobacco Use    Smoking status: Never     Passive exposure: Never    Smokeless tobacco: Never   Substance Use Topics    Alcohol use: Not Currently    Drug use: Never     Family History   Problem Relation Age of Onset    Osteoporosis Paternal Grandmother      OB History    Para Term  AB Living   5 3 3   2 3   SAB IAB Ectopic Multiple Live Births         0 3      # Outcome Date GA Lbr Dm/2nd Weight Sex Delivery Anes PTL Lv   5 Term 23 39w0d  3.572 kg (7 lb 14 oz) M CS-LTranv Spinal  KAT   4 Term 21 39w0d  3.033 kg (6 lb 11 oz) F  EPI  KAT   3 Term 19 39w0d  3.374 kg (7 lb 7 oz) F CS-LTranv EPI  KAT   2 AB 2018 8w0d       FD   1 AB 2017 7w0d       FD        Review the Delivery Report for details.     ROS:   Review of Systems  A comprehensive review of symptoms was completed and negative except as noted above.      Objective:     /60 (BP Location: Right arm)   Pulse 77   Resp 17   Ht 5' 2" (1.575 m)   Wt 71.6 kg (157 lb 13.6 oz)   LMP 2022   SpO2 98%   Breastfeeding Yes   BMI 28.87 kg/m²   Constitutional:  General Appearance : alert, in no acute distress, normal, well nourished.  ABDOMEN: INCISION " C/D/I  Genitourinary:  External Genitalia: normal, no lesions.  Vagina: normal appearance, no abnormal discharge, no lesions.  Bladder: no mass, nontender.  Urethra: no erythema or lesions present.  Cervix: no lesions, non tender.   Uterus: nontender, normal contour, normal mobility, normal size.   Adnexa: no masses, no tenderness.  Anus and Perineum: visually normal.   Chaperone Present  Assessment:     Postpartum exam        Plan:     Return to clinic For annual exam. In addition, the patient has also been instructed to follow up on as needed basis. All questions were answered and the patient voiced understanding of the above issues.

## 2023-10-23 ENCOUNTER — OFFICE VISIT (OUTPATIENT)
Dept: OBSTETRICS AND GYNECOLOGY | Facility: CLINIC | Age: 33
End: 2023-10-23
Payer: COMMERCIAL

## 2023-10-23 VITALS
WEIGHT: 161.5 LBS | HEIGHT: 62 IN | BODY MASS INDEX: 29.72 KG/M2 | HEART RATE: 77 BPM | DIASTOLIC BLOOD PRESSURE: 68 MMHG | SYSTOLIC BLOOD PRESSURE: 100 MMHG

## 2023-10-23 DIAGNOSIS — Z01.419 ROUTINE GYNECOLOGICAL EXAMINATION: Primary | ICD-10-CM

## 2023-10-23 PROCEDURE — 3008F PR BODY MASS INDEX (BMI) DOCUMENTED: ICD-10-PCS | Mod: CPTII,,, | Performed by: NURSE PRACTITIONER

## 2023-10-23 PROCEDURE — 99395 PR PREVENTIVE VISIT,EST,18-39: ICD-10-PCS | Mod: ,,, | Performed by: NURSE PRACTITIONER

## 2023-10-23 PROCEDURE — 3078F DIAST BP <80 MM HG: CPT | Mod: CPTII,,, | Performed by: NURSE PRACTITIONER

## 2023-10-23 PROCEDURE — 99395 PREV VISIT EST AGE 18-39: CPT | Mod: ,,, | Performed by: NURSE PRACTITIONER

## 2023-10-23 PROCEDURE — 3074F SYST BP LT 130 MM HG: CPT | Mod: CPTII,,, | Performed by: NURSE PRACTITIONER

## 2023-10-23 PROCEDURE — 1159F PR MEDICATION LIST DOCUMENTED IN MEDICAL RECORD: ICD-10-PCS | Mod: CPTII,,, | Performed by: NURSE PRACTITIONER

## 2023-10-23 PROCEDURE — 3074F PR MOST RECENT SYSTOLIC BLOOD PRESSURE < 130 MM HG: ICD-10-PCS | Mod: CPTII,,, | Performed by: NURSE PRACTITIONER

## 2023-10-23 PROCEDURE — 1160F PR REVIEW ALL MEDS BY PRESCRIBER/CLIN PHARMACIST DOCUMENTED: ICD-10-PCS | Mod: CPTII,,, | Performed by: NURSE PRACTITIONER

## 2023-10-23 PROCEDURE — 3008F BODY MASS INDEX DOCD: CPT | Mod: CPTII,,, | Performed by: NURSE PRACTITIONER

## 2023-10-23 PROCEDURE — 3078F PR MOST RECENT DIASTOLIC BLOOD PRESSURE < 80 MM HG: ICD-10-PCS | Mod: CPTII,,, | Performed by: NURSE PRACTITIONER

## 2023-10-23 PROCEDURE — 1160F RVW MEDS BY RX/DR IN RCRD: CPT | Mod: CPTII,,, | Performed by: NURSE PRACTITIONER

## 2023-10-23 PROCEDURE — 1159F MED LIST DOCD IN RCRD: CPT | Mod: CPTII,,, | Performed by: NURSE PRACTITIONER

## 2023-10-23 NOTE — PROGRESS NOTES
Patient ID: [id number]   Chief Complaint: Annual exam  Chief Complaint   Patient presents with    Routine Prenatal Visit     NO C/O'S.DECLINES STD TESTING VIA PAP.     HPI:   Gena Benavidez is a 33 y.o. year old  here for her Annual Exam. Patient's last menstrual period was 10/17/2023 (exact date). She is doing well. Denies any health changes. Routine Prenatal Visit (NO C/O'S.DECLINES STD TESTING VIA PAP.)    Subjective:     Past Medical History:   Diagnosis Date    MTHFR gene mutation      Past Surgical History:   Procedure Laterality Date     SECTION       SECTION N/A 3/30/2023    Procedure:  SECTION;  Surgeon: Doug Adorno MD;  Location: [facility] OR;  Service: OB/GYN;  Laterality: N/A;    DILATION AND CURETTAGE OF UTERUS      MOUTH SURGERY       Social History     Tobacco Use    Smoking status: Never     Passive exposure: Never    Smokeless tobacco: Never   Substance Use Topics    Alcohol use: Yes     Comment: SOCIAL    Drug use: Never     Family History   Problem Relation Age of Onset    Osteoporosis Paternal Grandmother      OB History    Para Term  AB Living   5 3 3   2 3   SAB IAB Ectopic Multiple Live Births         0 3      # Outcome Date GA Lbr Dm/2nd Weight Sex Delivery Anes PTL Lv   5 Term 23 39w0d  3.572 kg (7 lb 14 oz) M CS-LTranv Spinal  KAT   4 Term 21 39w0d  3.033 kg (6 lb 11 oz) F  EPI  KAT   3 Term 19 39w0d  3.374 kg (7 lb 7 oz) F CS-LTranv EPI  KAT   2 AB 2018 8w0d       FD   1 AB 2017 7w0d       FD       Current Outpatient Medications:     ibuprofen (ADVIL,MOTRIN) 600 MG tablet, Take 1 tablet (600 mg total) by mouth every 6 (six) hours as needed for Other (pain). (Patient not taking: Reported on 10/23/2023), Disp: 25 tablet, Rfl: 0    PNV Comb No.59/Iron/FA/DHA (PRENATAL-DHA ORAL), Take by mouth., Disp: , Rfl:   MENARCHEAL:  Cycle Length: 5 days   Flow: heavy  Dysmenorrhea: No  Intermenstrual Bleeding: No  PAP:  Last PAP:  "8/31/2022    History of Abnormal PAP Smear: NO  HPV Vaccine: YES: AS TEEN  INTERCOURSE:  Dyspareunia: No  Postcoital Bleeding: No  History of STI: No  Current Birth Control Method: none  Sexually Active: yes  BREAST HISTORY:   Last Mammogram: N/A  COLONOSCOPY:  Last Colonoscopy:  N/A         Review of Systems 12 point review of systems conducted, negative except as stated in the history of present illness. See HPI for details.  Objective:   Visit Vitals  /68   Pulse 77   Ht 5' 2" (1.575 m)   Wt 73.3 kg (161 lb 8 oz)   LMP 10/17/2023 (Exact Date)   Breastfeeding No   BMI 29.54 kg/m²     No results found for this or any previous visit (from the past 24 hour(s)).  Physical Exam:  Physical Exam  Constitutional:  General Appearance : alert, in no acute distress, normal, well nourished.  Respiratory:  Respiratory Effort: normal.  Breast:  Right: Inspection/palpation: no discharge, no masses present, no nipple retraction, no skin changes, no skin dimpling, no tenderness, no lymphadenopathy, no axillary mass, no axillary tenderness.  Left: Inspection/palpation: no discharge, no masses present, no nipple retraction, no skin changes, no skin dimpling, no tenderness, no lymphadenopathy, no axillary mass, no axillary tenderness.  Gastrointestinal:  Abdomen: no masses. no tender, nondistended.  Liver and spleen: normal  Hernias: no hernias present, no inguinal adenopathy.  Genitourinary:  External Genitalia: normal, no lesions.  Vagina: normal appearance, no abnormal discharge, no lesions.  Bladder: no mass, nontender.  Urethra: no erythema or lesions present.  Cervix: no lesions, non tender. Pap Done  Uterus: nontender, normal contour, normal mobility, normal size.   Adnexa: no masses, no tenderness.  Anus and Perineum: visually normal.   Chaperone Present  No results found for this or any previous visit (from the past 24 hour(s)).  Assessment/Plan:   Assessment:   Routine gynecological examination  -     Liquid-Based Pap " Smear, Screening Screening      Follow up in about 1 year (around 10/23/2024) for ANNUAL. In addition to their scheduled FU, the patient has also been instructed to follow up on as needed basis. All questions were answered and the patient voiced understanding of the above issues.

## 2023-10-25 LAB — PSYCHE PATHOLOGY RESULT: NORMAL

## 2024-11-20 ENCOUNTER — OFFICE VISIT (OUTPATIENT)
Dept: OBSTETRICS AND GYNECOLOGY | Facility: CLINIC | Age: 34
End: 2024-11-20
Payer: COMMERCIAL

## 2024-11-20 VITALS
SYSTOLIC BLOOD PRESSURE: 120 MMHG | WEIGHT: 168 LBS | BODY MASS INDEX: 30.73 KG/M2 | DIASTOLIC BLOOD PRESSURE: 60 MMHG | HEART RATE: 98 BPM

## 2024-11-20 DIAGNOSIS — Z01.419 ROUTINE GYNECOLOGICAL EXAMINATION: Primary | ICD-10-CM

## 2024-11-20 PROCEDURE — 3008F BODY MASS INDEX DOCD: CPT | Mod: CPTII,,, | Performed by: OBSTETRICS & GYNECOLOGY

## 2024-11-20 PROCEDURE — 3078F DIAST BP <80 MM HG: CPT | Mod: CPTII,,, | Performed by: OBSTETRICS & GYNECOLOGY

## 2024-11-20 PROCEDURE — 99395 PREV VISIT EST AGE 18-39: CPT | Mod: ,,, | Performed by: OBSTETRICS & GYNECOLOGY

## 2024-11-20 PROCEDURE — 1160F RVW MEDS BY RX/DR IN RCRD: CPT | Mod: CPTII,,, | Performed by: OBSTETRICS & GYNECOLOGY

## 2024-11-20 PROCEDURE — 1159F MED LIST DOCD IN RCRD: CPT | Mod: CPTII,,, | Performed by: OBSTETRICS & GYNECOLOGY

## 2024-11-20 PROCEDURE — 3074F SYST BP LT 130 MM HG: CPT | Mod: CPTII,,, | Performed by: OBSTETRICS & GYNECOLOGY

## 2024-11-20 NOTE — PROGRESS NOTES
Patient ID: [id number]   Chief Complaint: Annual exam  Chief Complaint   Patient presents with    Annual Exam     NO C/O'S.     HPI:   Gena Benavidez is a 34 y.o. year old  here for her Annual Exam.    Patient's last menstrual period was 10/25/2024.   She is doing well. Denies any health changes.   Annual Exam (NO C/O'S.)    Subjective:     Past Medical History:   Diagnosis Date    MTHFR gene mutation      Past Surgical History:   Procedure Laterality Date     SECTION       SECTION N/A 3/30/2023    Procedure:  SECTION;  Surgeon: Doug Adorno MD;  Location: [facility] OR;  Service: OB/GYN;  Laterality: N/A;    DILATION AND CURETTAGE OF UTERUS      MOUTH SURGERY       Social History     Tobacco Use    Smoking status: Never     Passive exposure: Never    Smokeless tobacco: Never   Substance Use Topics    Alcohol use: Yes     Comment: SOCIAL    Drug use: Never     Family History   Problem Relation Name Age of Onset    Osteoporosis Paternal Grandmother       OB History    Para Term  AB Living   5 3 3   2 3   SAB IAB Ectopic Multiple Live Births         0 3      # Outcome Date GA Lbr Dm/2nd Weight Sex Type Anes PTL Lv   5 Term 23 39w0d  3.572 kg (7 lb 14 oz) M CS-LTranv Spinal  KAT   4 Term 21 39w0d  3.033 kg (6 lb 11 oz) F  EPI  KAT   3 Term 19 39w0d  3.374 kg (7 lb 7 oz) F CS-LTranv EPI  KAT   2 AB 2018 8w0d       FD   1 AB 2017 7w0d       FD     No current outpatient medications on file.  MENARCHEAL:  Cycle Length: 5 days   Flow: normal  Dysmenorrhea: No  Intermenstrual Bleeding: No  PAP:  Last PAP: 10/25/2023    History of Abnormal PAP Smear: NO  HPV Vaccine: NO  INTERCOURSE:  Dyspareunia: No  Postcoital Bleeding: No  History of STI: No   If yes, then: No   Current Birth Control Method: none  Sexually Active: yes  Review of Systems 12 point review of systems conducted, negative except as stated in the history of present illness.     See HPI for  details.  Objective:   Visit Vitals  /60   Pulse 98   Wt 76.2 kg (168 lb)   LMP 10/25/2024   Breastfeeding No   BMI 30.73 kg/m²     No results found for this or any previous visit (from the past 24 hours).  Physical Exam:  Chaperone present for exam.  Physical Exam  Constitutional:  General Appearance : alert, in no acute distress, normal, well nourished.  Cardiovascular:   Regular rate and rhythm.  Respiratory:  Respiratory Effort: normal.  Breast:  Right: Inspection/palpation: no discharge, no masses present, no nipple retraction, no skin changes, no skin dimpling, no tenderness, no lymphadenopathy, no axillary mass, no axillary tenderness.  Left: Inspection/palpation: no discharge, no masses present, no nipple retraction, no skin changes, no skin dimpling, no tenderness, no lymphadenopathy, no axillary mass, no axillary tenderness.  Gastrointestinal:  Abdomen: no masses. no tender, nondistended.  Genitourinary:  External Genitalia: normal, no lesions.  Vagina: normal appearance, no abnormal discharge, no lesions.  Bladder: no mass, nontender.  Urethra: no erythema or lesions present.  Cervix: no lesions, non tender. Pap Done PT DECLINED STD TESTING  Uterus: nontender, normal contour, normal mobility, normal size.   Adnexa: no masses, no tenderness.  Anus and Perineum: visually normal.     No results found for this or any previous visit (from the past 24 hours).  Assessment/Plan:   Assessment:   Routine gynecological examination  -     Liquid-Based Pap Smear, Screening      Follow up in about 1 year (around 11/20/2025) for WWE.     In addition to their scheduled follow-up, the patient has also been instructed to follow up on as needed basis.   All questions were answered and the patient voiced understanding of the above issues.

## 2024-11-22 LAB — PSYCHE PATHOLOGY RESULT: NORMAL

## (undated) DEVICE — SUT VICRYL COAT 910 1X36IN

## (undated) DEVICE — ADHESIVE DERMABOND ADVANCED

## (undated) DEVICE — PAD PREP CUFFED NS 24X48IN

## (undated) DEVICE — GLOVE 8.0 PROTEXIS PI MICRO

## (undated) DEVICE — PACK C-SECTION CUSTOM

## (undated) DEVICE — SUT 0 27IN CHROMIC GUT CT-1

## (undated) DEVICE — PAD UNDERPAD 30X30

## (undated) DEVICE — TRAY CATH URETHRAL FOLEY 16FR

## (undated) DEVICE — TOWEL OR DISP STRL BLUE 4/PK

## (undated) DEVICE — APPLICATOR CHLORAPREP ORN 26ML

## (undated) DEVICE — SUT MONO 1 CTX VIL MONO

## (undated) DEVICE — DRAPE HALF SURGICAL 40X58IN

## (undated) DEVICE — SUT 3/0 27IN COATED VICRYL

## (undated) DEVICE — GOWN SMARTGOWN 3XL XLONG

## (undated) DEVICE — SUT CHROMIC GUT 0 18IN